# Patient Record
Sex: FEMALE | Race: WHITE | NOT HISPANIC OR LATINO | Employment: OTHER | URBAN - METROPOLITAN AREA
[De-identification: names, ages, dates, MRNs, and addresses within clinical notes are randomized per-mention and may not be internally consistent; named-entity substitution may affect disease eponyms.]

---

## 2019-07-08 ENCOUNTER — OFFICE VISIT (OUTPATIENT)
Dept: URGENT CARE | Facility: URGENT CARE | Age: 79
End: 2019-07-08
Payer: MEDICARE

## 2019-07-08 VITALS
WEIGHT: 178 LBS | SYSTOLIC BLOOD PRESSURE: 135 MMHG | HEART RATE: 81 BPM | DIASTOLIC BLOOD PRESSURE: 74 MMHG | OXYGEN SATURATION: 97 % | TEMPERATURE: 98.5 F

## 2019-07-08 DIAGNOSIS — S01.01XA SCALP LACERATION, INITIAL ENCOUNTER: ICD-10-CM

## 2019-07-08 DIAGNOSIS — S09.90XA HEAD INJURY, INITIAL ENCOUNTER: Primary | ICD-10-CM

## 2019-07-08 PROCEDURE — 99204 OFFICE O/P NEW MOD 45 MIN: CPT | Performed by: PHYSICIAN ASSISTANT

## 2019-07-08 ASSESSMENT — ENCOUNTER SYMPTOMS
NAUSEA: 0
MEMORY LOSS: 1
LOSS OF CONSCIOUSNESS: 0
FEVER: 0
BLURRED VISION: 0
FOCAL WEAKNESS: 0
VOMITING: 0
COUGH: 0
SHORTNESS OF BREATH: 0
DIZZINESS: 0
HEADACHES: 1
SPEECH CHANGE: 1
WEAKNESS: 0
SORE THROAT: 0

## 2019-07-08 NOTE — PROGRESS NOTES
"SUBJECTIVE:   Kirsten Cortés is a 79 year old female presenting for evaluation of   Chief Complaint   Patient presents with     Urgent Care     Pt in clinic c/o scalp laceration, facial brusing and swelling due to fall 7/2/2019.     Facial Swelling     Laceration   .  On 7/2 while in a park in [place], [place], she was walking into the bathroom and fell on a step that was leading towards the shower. She fell with her head into the shower. She struck her head. No LOC. She had a headache that night. She felt nauseous and lightheaded at the time of the incident. No blurry vision.  She sustained a scalp laceration and has a head hematoma.  Says the lac bled a lot. States that a paramedic came to the scene. She says the EMT took her BP and it was \"220 over something.\" The EMT waited with her and she did not want to go into the hospital so she just waited.  She states she has \"had problems with mental acuity\" after the fall. She notes more memory issues than usual, and feeling distracted, and absent-minded.  No other pain other than her head. She has been walking okay.  No blood thinner use.      Review of Systems   Constitutional: Negative for fever and malaise/fatigue.   HENT: Negative for sore throat.         Nose running right side of nose   Eyes: Negative for blurred vision.   Respiratory: Negative for cough and shortness of breath.    Cardiovascular: Negative for chest pain.   Gastrointestinal: Negative for nausea and vomiting.   Skin:        Bruising right side of face   Neurological: Positive for speech change (trouble with word finding) and headaches. Negative for dizziness, focal weakness, loss of consciousness and weakness.   Psychiatric/Behavioral: Positive for memory loss.       PMH:  Past Medical History:   Diagnosis Date     Depression      Hypothyroidism      Renal cell carcinoma (H)      There are no active problems to display for this patient.        Current medications:  No current outpatient " medications on file.       Surgical History:  Past Surgical History:   Procedure Laterality Date     HYSTERECTOMY       KIDNEY SURGERY       THYROIDECTOMY         Family history:  History reviewed. No pertinent family history.      Social History:  Social History     Tobacco Use     Smoking status: Never Smoker     Smokeless tobacco: Never Used   Substance Use Topics     Alcohol use: Not on file     Lives in a senior Co-op    I personally reviewed and updated patient's PMH, PSH, FH, and SH.          OBJECTIVE:  /74   Pulse 81   Temp 98.5  F (36.9  C) (Oral)   Wt 80.7 kg (178 lb)   SpO2 97%     Physical Exam  General Appearance:  Alert, cooperative, older female in no acute distress, appears stated age  Skin: Warm, dry. Fading ecchymosis right side of face, periorbital region.  Head: right posterior parietal region scalp hematoma. 10cm long old scalp laceration running along right parietal to temporal scalp. No active bleeding.   Eyes: Conjunctiva clear, lids normal. PERRL. Right periorbital swelling and eccymosis.   Ear, Nose, Throat: Face nontraumatic, moist mucus membranes. TMs normal bilaterally. No hemotympanum.   Neck: Supple, no lymphadenopathy, no evidence of meningismus. No cervical tenderness. Neck AROM flexion, extension, lateral flexion, lateral rotation intact.  Lungs: No distress. Equal air entry to bases bilaterally. Lungs clear to ausculation bilaterally. No wheezes, rhonchi or stridor. Chest wall nontender.    Heart: Regular rate and rhythm, no murmur, rub or gallop. Strong, equal distal pulses.    Abdomen: Soft, nontender.  No rebound or guarding.    Musculoskeletal: Extremities: Moving all extremities equally. Back: no C, T, or Lspine tenderness or crepitus. Gait: steady, no antalgia. Normal strength.    Neurologic: GCS 15. Alert and orientated appropriately. No focal deficits. Speech and coordination intact.  Psych: Normal mood and affect          Labs:  No results found for this or any  previous visit (from the past 24 hour(s)).    X-Ray was not done.      ASSESSMENT/PLAN:      ICD-10-CM    1. Head injury, initial encounter S09.90XA    2. Scalp laceration, initial encounter S01.01XA         Medical Decision Making:    Serious Comorbid Conditions: RCC    Patient is an elderly female presenting with a head injury on 7/2. Has significant facial ecchymoses and scalp hematoma. Also notes some memory issues worsening since the injury. Given her age, needs head CT, likely also facial bones CT.  I discussed with patient that she needs to be seen in the ED today. She was amenable- will get a ride to the ED at [facility] today. She is appropriate for transport via private car.      Patient Instructions   Please go into the ER at [facility] for a head CT scan today.                  Alexandria Johnson PA-C  07/08/19 1:51 PM

## 2022-04-06 ENCOUNTER — TRANSFERRED RECORDS (OUTPATIENT)
Dept: HEALTH INFORMATION MANAGEMENT | Facility: CLINIC | Age: 82
End: 2022-04-06

## 2023-01-12 ENCOUNTER — TRANSFERRED RECORDS (OUTPATIENT)
Dept: HEALTH INFORMATION MANAGEMENT | Facility: CLINIC | Age: 83
End: 2023-01-12

## 2023-01-25 ENCOUNTER — MEDICAL CORRESPONDENCE (OUTPATIENT)
Dept: HEALTH INFORMATION MANAGEMENT | Facility: CLINIC | Age: 83
End: 2023-01-25

## 2023-01-25 ENCOUNTER — TRANSFERRED RECORDS (OUTPATIENT)
Dept: HEALTH INFORMATION MANAGEMENT | Facility: CLINIC | Age: 83
End: 2023-01-25

## 2023-04-20 ENCOUNTER — TRANSFERRED RECORDS (OUTPATIENT)
Dept: HEALTH INFORMATION MANAGEMENT | Facility: CLINIC | Age: 83
End: 2023-04-20

## 2023-06-06 ENCOUNTER — MEDICAL CORRESPONDENCE (OUTPATIENT)
Dept: HEALTH INFORMATION MANAGEMENT | Facility: CLINIC | Age: 83
End: 2023-06-06

## 2023-07-05 ENCOUNTER — TRANSCRIBE ORDERS (OUTPATIENT)
Dept: OTHER | Age: 83
End: 2023-07-05

## 2023-07-05 ENCOUNTER — MEDICAL CORRESPONDENCE (OUTPATIENT)
Dept: HEALTH INFORMATION MANAGEMENT | Facility: CLINIC | Age: 83
End: 2023-07-05
Payer: MEDICARE

## 2023-07-05 DIAGNOSIS — H53.9 VISUAL DISTURBANCE: Primary | ICD-10-CM

## 2023-07-06 ENCOUNTER — TELEPHONE (OUTPATIENT)
Dept: OPHTHALMOLOGY | Facility: CLINIC | Age: 83
End: 2023-07-06
Payer: MEDICARE

## 2023-07-06 NOTE — TELEPHONE ENCOUNTER
[facility] Call Center    Phone Message    May a detailed message be left on voicemail: yes     Reason for Call: Appointment Intake    Referring Provider Name: Referred by Dr. Claire Hearn at [facility]  Diagnosis and/or Symptoms: referral to Dr. Lira [facility] for visual disturbance. Please review how to schedule.  Not in GL's.    Action Taken: Message routed to:  [facility] ([facility] eye    Travel Screening: Not Applicable

## 2023-07-07 NOTE — TELEPHONE ENCOUNTER
Called and LVM     Kirsten can make an appointment with any general opth ...     Dr. Lira does not see Visual disturbance and is recommending Kirsten see a general opth    Viky Gallo Communication Facilitator on 7/7/2023 at 10:00 AM

## 2023-07-19 ENCOUNTER — MEDICAL CORRESPONDENCE (OUTPATIENT)
Dept: HEALTH INFORMATION MANAGEMENT | Facility: CLINIC | Age: 83
End: 2023-07-19

## 2023-07-19 ENCOUNTER — OFFICE VISIT (OUTPATIENT)
Dept: OPHTHALMOLOGY | Facility: CLINIC | Age: 83
End: 2023-07-19
Attending: FAMILY MEDICINE
Payer: MEDICARE

## 2023-07-19 DIAGNOSIS — Z01.00 EXAMINATION OF EYES AND VISION: ICD-10-CM

## 2023-07-19 DIAGNOSIS — H43.811 PVD (POSTERIOR VITREOUS DETACHMENT), RIGHT EYE: ICD-10-CM

## 2023-07-19 DIAGNOSIS — H52.7 REFRACTIVE ERROR: ICD-10-CM

## 2023-07-19 DIAGNOSIS — H53.10 SUBJECTIVE VISUAL DISTURBANCE OF BOTH EYES: Primary | ICD-10-CM

## 2023-07-19 DIAGNOSIS — H50.05 ALTERNATING ESOTROPIA: ICD-10-CM

## 2023-07-19 DIAGNOSIS — Z96.1 PSEUDOPHAKIA OF BOTH EYES: ICD-10-CM

## 2023-07-19 PROCEDURE — G0463 HOSPITAL OUTPT CLINIC VISIT: HCPCS | Performed by: STUDENT IN AN ORGANIZED HEALTH CARE EDUCATION/TRAINING PROGRAM

## 2023-07-19 PROCEDURE — 92134 CPTRZ OPH DX IMG PST SGM RTA: CPT | Performed by: STUDENT IN AN ORGANIZED HEALTH CARE EDUCATION/TRAINING PROGRAM

## 2023-07-19 PROCEDURE — 92133 CPTRZD OPH DX IMG PST SGM ON: CPT | Performed by: STUDENT IN AN ORGANIZED HEALTH CARE EDUCATION/TRAINING PROGRAM

## 2023-07-19 PROCEDURE — 92004 COMPRE OPH EXAM NEW PT 1/>: CPT | Mod: GC | Performed by: STUDENT IN AN ORGANIZED HEALTH CARE EDUCATION/TRAINING PROGRAM

## 2023-07-19 PROCEDURE — 92134 CPTRZ OPH DX IMG PST SGM RTA: CPT | Mod: 26 | Performed by: STUDENT IN AN ORGANIZED HEALTH CARE EDUCATION/TRAINING PROGRAM

## 2023-07-19 PROCEDURE — 99207 OCT RETINA SPECTRALIS OU (BOTH EYE): CPT | Mod: 26 | Performed by: STUDENT IN AN ORGANIZED HEALTH CARE EDUCATION/TRAINING PROGRAM

## 2023-07-19 RX ORDER — ATORVASTATIN CALCIUM 40 MG/1
40 TABLET, FILM COATED ORAL DAILY
COMMUNITY
Start: 2023-06-16

## 2023-07-19 RX ORDER — FLUOXETINE 40 MG/1
40 CAPSULE ORAL DAILY
COMMUNITY
Start: 2023-05-04

## 2023-07-19 ASSESSMENT — CUP TO DISC RATIO
OS_RATIO: 0.2
OD_RATIO: 0.3

## 2023-07-19 ASSESSMENT — EXTERNAL EXAM - LEFT EYE: OS_EXAM: NORMAL

## 2023-07-19 ASSESSMENT — EXTERNAL EXAM - RIGHT EYE: OD_EXAM: NORMAL

## 2023-07-19 ASSESSMENT — REFRACTION_MANIFEST
OD_SPHERE: -1.50
OS_SPHERE: -0.50
OS_AXIS: 025
OD_CYLINDER: SPHERE
OS_CYLINDER: +0.25
OD_ADD: +2.50
OS_ADD: +2.50

## 2023-07-19 ASSESSMENT — CONF VISUAL FIELD
OS_SUPERIOR_TEMPORAL_RESTRICTION: 0
OD_INFERIOR_NASAL_RESTRICTION: 0
OD_SUPERIOR_NASAL_RESTRICTION: 0
OD_INFERIOR_TEMPORAL_RESTRICTION: 0
OS_NORMAL: 1
OS_INFERIOR_TEMPORAL_RESTRICTION: 0
OS_INFERIOR_NASAL_RESTRICTION: 0
OD_NORMAL: 1
OS_SUPERIOR_NASAL_RESTRICTION: 0
OD_SUPERIOR_TEMPORAL_RESTRICTION: 0
METHOD: COUNTING FINGERS

## 2023-07-19 ASSESSMENT — VISUAL ACUITY
OD_SC: 20/40
OS_SC: 20/20
OD_SC+: +1
METHOD: SNELLEN - LINEAR
OD_PH_SC: 20/25
OD_SC: J1

## 2023-07-19 ASSESSMENT — TONOMETRY
OD_IOP_MMHG: 12
IOP_METHOD: TONOPEN
OS_IOP_MMHG: 10

## 2023-07-19 NOTE — LETTER
"7/19/2023       RE: Kirsten Cortés  [address]  [facility] [address]     Dear Colleague,    Thank you for referring your patient, Kirsten Cortés, to the [facility] [place] at [facility]. Please see a copy of my visit note below.    HPI     Referral     Additional comments: Referred by Dr. Claire Hearn at [facility] for visual disturbance.           COMPREHENSIVE EYE EXAM    In both eyes.  Associated symptoms include dryness and floaters.  Negative for glare, haloes, double vision, eye pain, redness, tearing, headache, photophobia and flashes.  Treatments tried include artificial tears.  Pain was noted as 0/10. Additional comments: New patient comprehensive eye exam.           Comments    Eye meds: AT's each eye as needed  Normal findings at last 2 eye exams Dr. Kc and Dr. Spencer.  Sees spots of color sometimes.  Floaters in left eye, goes away.  No history of migraine.  In dark see well defined shapes that wave as if a current passing through them.  Sparkles flickering in vision, sequins.  Does not wear glasses since cataract surgery each eye in 2022.    REESE Latham 7/19/2023 9:31 AM            Last edited by Ankit Daly CO on 7/19/2023  9:31 AM.          Patient had normal baseline vision other than changes from cataracts in both eyes back in 4/2022 when she was seen at [facility] and evaluated for cataract surgery with Dr. Le with plan for monofocal IOL with target of monovision (-2.00 OD and distance OS). Both surgeries performed 6/2022 and 7/2022 with normal follow up. Vision was fine per patient until 10/2022 when she started noticing changes in the vision of both her eyes. She brings a printed a paper which describes her symptoms as follows:     \"For eight months now I've seen black sparkling wrigglies in my dark bedroom just after turning off the light. Dark, slender waving shapes hang from the ceiling " "above my face. If I move my hand among them, some will still wriggle in front of my hand. Occasionally I will feel the lightest touch, like a barely noticeable electric shock. When I close my eyes and open them again, the shapes have disappeared. Recently I've begun to see subtle movements in a textured surface like the leather grain of my purse chau the texture of a painted wall. Once the paper drape on the exam chair/table in my clinic moved in large slow movements as if it were breathing. Otherwise my sight is normal. Two ophthalmologists have told me my retina is healthy.\"    First evaluated by Dr. Spencer in a private practice for these symptoms who felt patient had normal exam. Then seen at [facility] for this 4/2023 by optom who found normal exam. PCP ordered MRI brain which was normal 5/2023.     Today she states the symptoms are every day and only at night mostly only for the short period after she turns off the light at night. In clinic she feels the center ceiling tile was moving a little when she was looking at it. Denies flashes. Did have brief floater in the past in left eye but has since resolved. Symptoms last as long as she keeps her eyes open and then resolves when she closes her eyes. They return when she keeps her eyes open for a while at night. No migraines or headaches.     Review of systems for the eyes was negative other than the pertinent positives/negatives listed in the HPI.    Ocular Meds:   Artificial tears PRN    Ocular Hx:   6/24/2022  KPE PCL OD set to near -2.00 (Dr. Emily Gracia)  CCA0T0  7/8/2022 KPE/PCL OS set to emmetropia.(Dr. Emily Gracia)  CCA0T0  BREE OU  MGD OU  ABMD OU      FOHx: no family history of glaucoma or blindness    PMHx:   Past Medical History:   Diagnosis Date     Depression      Hypothyroidism      Renal cell carcinoma (H)      Imaging:  MR BRAIN without and with Contrast 5/19/23  IMPRESSION:   1.  No finding for intracranial hemorrhage, mass, or acute infarct. "     2.  Mild volume loss and mild to moderate presumed sequela chronic microvascular ischemic change.       Assessment & Plan     Kirsten Cortés is a 83 year old female with the following diagnoses:    Subjective visual disturbance OU  - Onset ~10/2020. CEIOL OU 6-7/2022. Describes as variable subtle intermittent changes/distortions in vision mostly in the dark when going to sleep that resolve when she closes her eyes. No migraines associated. No daytime visual snow.   - visual acuity excellent, IOP wnl, no RAPD OU, color plates grossly full OU, CVF full to CF OU  - anterior/posterior eye exam grossly unremarkable  - OCT RNFL and OCT Macula also grossly unremarkable OU  - MRI Brain as above  - overall eye exam reassuring  - Primary care provider recommended evaluation by Dr. Lira. Patient states given that today her eye health looks okay she will hold off on following up with neuro-ophthalmology; if any changes occur or worsening of symptoms patient can call to schedule follow up  - of note, patient having memory issues, and does have family history of dementia; advised to keep follow up with neurology  - letter to Dr. Claire Hearn    Pseudophakia OU  - observe    MGD/Dry eyes OU  - warm compresses BID OU x 5-10 min each time  - PFATs qid and prn OU    Epiretinal membrane OU  - very minimal, noted on OCT macula OU  - Not visually significant  - amsler precautions  - observe    Intermittent Esotropia   - EOM otherwise full OU; patient does not have diplopia  - offered referral to neuro-ophthalmology, but will hold off for now given asymptomatic; any changes patient will call in     Posterior Vitreous Detachment OD  - no new flashes, floaters, or curtain down visual field  - no holes, tears, or detachment on exam; ignacia's negative OU  - counseled RD/return precautions    Drusen OD  - rare and small; noted on oct macula  - green leafy vegetables  - amsler precautions    Refractive error   - patient has mono vision;  right eye is near left eye is distance vision  - observe    Counseled return/RD precautions    Patient disposition:   Return in about 1 year (around 7/19/2024) for Annual Visit, or sooner changes. patient plans to follow up with her regular eye care provider    Jyoti Richards MD  PGY-4  Ophthalmology   [facility]    Attending Physician Attestation:  Complete documentation of historical and exam elements from today's encounter can be found in the full encounter summary report (not reduplicated in this progress note).  I personally obtained the chief complaint(s) and history of present illness.  I confirmed and edited as necessary the review of systems, past medical/surgical history, family history, social history, and examination findings as documented by others; and I examined the patient myself.  I personally reviewed the relevant tests, images, and reports as documented above.  I formulated and edited as necessary the assessment and plan and discussed the findings and management plan with the patient and family. . - Haley Miranda MD        Again, thank you for allowing me to participate in the care of your patient.      Sincerely,    Haley Miranda MD

## 2023-07-19 NOTE — PROGRESS NOTES
"HPI     Referral     Additional comments: Referred by Dr. Claire Hearn at [facility] for visual disturbance.           COMPREHENSIVE EYE EXAM    In both eyes.  Associated symptoms include dryness and floaters.  Negative for glare, haloes, double vision, eye pain, redness, tearing, headache, photophobia and flashes.  Treatments tried include artificial tears.  Pain was noted as 0/10. Additional comments: New patient comprehensive eye exam.           Comments    Eye meds: AT's each eye as needed  Normal findings at last 2 eye exams Dr. Kc and Dr. Spencer.  Sees spots of color sometimes.  Floaters in left eye, goes away.  No history of migraine.  In dark see well defined shapes that wave as if a current passing through them.  Sparkles flickering in vision, sequins.  Does not wear glasses since cataract surgery each eye in 2022.    REESE Latham 7/19/2023 9:31 AM            Last edited by Ankit Daly CO on 7/19/2023  9:31 AM.          Patient had normal baseline vision other than changes from cataracts in both eyes back in 4/2022 when she was seen at [facility] and evaluated for cataract surgery with Dr. Le with plan for monofocal IOL with target of monovision (-2.00 OD and distance OS). Both surgeries performed 6/2022 and 7/2022 with normal follow up. Vision was fine per patient until 10/2022 when she started noticing changes in the vision of both her eyes. She brings a printed a paper which describes her symptoms as follows:     \"For eight months now I've seen black sparkling wrigglies in my dark bedroom just after turning off the light. Dark, slender waving shapes hang from the ceiling above my face. If I move my hand among them, some will still wriggle in front of my hand. Occasionally I will feel the lightest touch, like a barely noticeable electric shock. When I close my eyes and open them again, the shapes have disappeared. Recently I've begun to see subtle movements in a textured surface like the " "leather grain of my purse chau the texture of a painted wall. Once the paper drape on the exam chair/table in my clinic moved in large slow movements as if it were breathing. Otherwise my sight is normal. Two ophthalmologists have told me my retina is healthy.\"    First evaluated by Dr. Spencer in a private practice for these symptoms who felt patient had normal exam. Then seen at [facility] for this 4/2023 by optom who found normal exam. PCP ordered MRI brain which was normal 5/2023.     Today she states the symptoms are every day and only at night mostly only for the short period after she turns off the light at night. In clinic she feels the center ceiling tile was moving a little when she was looking at it. Denies flashes. Did have brief floater in the past in left eye but has since resolved. Symptoms last as long as she keeps her eyes open and then resolves when she closes her eyes. They return when she keeps her eyes open for a while at night. No migraines or headaches.     Review of systems for the eyes was negative other than the pertinent positives/negatives listed in the HPI.    Ocular Meds:   Artificial tears PRN    Ocular Hx:   6/24/2022  KPE PCL OD set to near -2.00 (Dr. Emily Gracia)  CCA0T0  7/8/2022 KPE/PCL OS set to emmetropia.(Dr. Emily Gracia)  CCA0T0  BREE OU  MGD OU  ABMD OU      FOHx: no family history of glaucoma or blindness    PMHx:   Past Medical History:   Diagnosis Date     Depression      Hypothyroidism      Renal cell carcinoma (H)      Imaging:  MR BRAIN without and with Contrast 5/19/23  IMPRESSION:   1.  No finding for intracranial hemorrhage, mass, or acute infarct.     2.  Mild volume loss and mild to moderate presumed sequela chronic microvascular ischemic change.       Assessment & Plan      Kirsten Cortés is a 83 year old female with the following diagnoses:    Subjective visual disturbance OU  - Onset ~10/2020. CEIOL OU 6-7/2022. Describes as variable subtle intermittent " changes/distortions in vision mostly in the dark when going to sleep that resolve when she closes her eyes. No migraines associated. No daytime visual snow.   - visual acuity excellent, IOP wnl, no RAPD OU, color plates grossly full OU, CVF full to CF OU  - anterior/posterior eye exam grossly unremarkable  - OCT RNFL and OCT Macula also grossly unremarkable OU  - MRI Brain as above  - overall eye exam reassuring  - Primary care provider recommended evaluation by Dr. Lira. Patient states given that today her eye health looks okay she will hold off on following up with neuro-ophthalmology; if any changes occur or worsening of symptoms patient can call to schedule follow up  - of note, patient having memory issues, and does have family history of dementia; advised to keep follow up with neurology  - letter to Dr. Claire Hearn    Pseudophakia OU  - observe    MGD/Dry eyes OU  - warm compresses BID OU x 5-10 min each time  - PFATs qid and prn OU    Epiretinal membrane OU  - very minimal, noted on OCT macula OU  - Not visually significant  - amsler precautions  - observe    Intermittent Esotropia   - EOM otherwise full OU; patient does not have diplopia  - offered referral to neuro-ophthalmology, but will hold off for now given asymptomatic; any changes patient will call in     Posterior Vitreous Detachment OD  - no new flashes, floaters, or curtain down visual field  - no holes, tears, or detachment on exam; ignacia's negative OU  - counseled RD/return precautions    Drusen OD  - rare and small; noted on oct macula  - green leafy vegetables  - amsler precautions    Refractive error   - patient has mono vision; right eye is near left eye is distance vision  - observe    Counseled return/RD precautions    Patient disposition:   Return in about 1 year (around 7/19/2024) for Annual Visit, or sooner changes. patient plans to follow up with her regular eye care provider    Jyoti Richards MD  PGY-4  Ophthalmology   [facility]  [place]    Attending Physician Attestation:  Complete documentation of historical and exam elements from today's encounter can be found in the full encounter summary report (not reduplicated in this progress note).  I personally obtained the chief complaint(s) and history of present illness.  I confirmed and edited as necessary the review of systems, past medical/surgical history, family history, social history, and examination findings as documented by others; and I examined the patient myself.  I personally reviewed the relevant tests, images, and reports as documented above.  I formulated and edited as necessary the assessment and plan and discussed the findings and management plan with the patient and family. . - Haley Miranda MD

## 2023-07-19 NOTE — NURSING NOTE
Chief Complaints and History of Present Illnesses   Patient presents with     Referral     Referred by Dr. Claire Hearn at [facility] for visual disturbance.     COMPREHENSIVE EYE EXAM     New patient comprehensive eye exam.      Chief Complaint(s) and History of Present Illness(es)     Referral            Comments: Referred by Dr. Claire Hearn at [facility] for visual disturbance.          COMPREHENSIVE EYE EXAM            Laterality: both eyes    Associated symptoms: dryness and floaters.  Negative for glare, haloes, double vision, eye pain, redness, tearing, headache, photophobia and flashes    Treatments tried: artificial tears    Pain scale: 0/10    Comments: New patient comprehensive eye exam.          Comments    Eye meds: AT's each eye as needed  Normal findings at last 2 eye exams Dr. Kc and Dr. Spencer.  Sees spots of color sometimes.  Floaters in left eye, goes away.  No history of migraine.  In dark see well defined shapes that wave as if a current passing through them.  Sparkles flickering in vision, sequins.  Does not wear glasses since cataract surgery each eye in 2022.    REESE Latham 7/19/2023 9:31 AM

## 2023-07-20 ASSESSMENT — SLIT LAMP EXAM - LIDS
COMMENTS: MGD
COMMENTS: MGD

## 2023-08-19 ENCOUNTER — HEALTH MAINTENANCE LETTER (OUTPATIENT)
Age: 83
End: 2023-08-19

## 2023-10-15 ENCOUNTER — OFFICE VISIT (OUTPATIENT)
Dept: URGENT CARE | Facility: URGENT CARE | Age: 83
End: 2023-10-15
Payer: MEDICARE

## 2023-10-15 VITALS
WEIGHT: 174 LBS | HEART RATE: 85 BPM | DIASTOLIC BLOOD PRESSURE: 84 MMHG | TEMPERATURE: 98.2 F | OXYGEN SATURATION: 95 % | SYSTOLIC BLOOD PRESSURE: 136 MMHG | HEIGHT: 64 IN | BODY MASS INDEX: 29.71 KG/M2

## 2023-10-15 DIAGNOSIS — S09.92XA NASAL TRAUMA, INITIAL ENCOUNTER: ICD-10-CM

## 2023-10-15 DIAGNOSIS — S01.511A LIP LACERATION, INITIAL ENCOUNTER: Primary | ICD-10-CM

## 2023-10-15 DIAGNOSIS — S61.412A LACERATION OF LEFT HAND WITHOUT FOREIGN BODY, INITIAL ENCOUNTER: ICD-10-CM

## 2023-10-15 PROCEDURE — 12011 RPR F/E/E/N/L/M 2.5 CM/<: CPT | Performed by: INTERNAL MEDICINE

## 2023-10-15 PROCEDURE — 12001 RPR S/N/AX/GEN/TRNK 2.5CM/<: CPT | Mod: XS | Performed by: INTERNAL MEDICINE

## 2023-10-15 ASSESSMENT — ENCOUNTER SYMPTOMS
NAUSEA: 0
VOMITING: 0
FATIGUE: 0

## 2023-10-16 ENCOUNTER — TELEPHONE (OUTPATIENT)
Dept: NURSING | Facility: CLINIC | Age: 83
End: 2023-10-16
Payer: MEDICARE
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
## 2023-10-16 NOTE — PATIENT INSTRUCTIONS
Please watch for symptoms of concussion due to fall and head injury.    Lip laceration repaired with glue    Hand laceration with stitches.  Return for stitch removal in 7 to 10 days.    Call or return to clinic if symptoms worsen or fail to improve as anticipated.

## 2023-10-17 ENCOUNTER — OFFICE VISIT (OUTPATIENT)
Dept: URGENT CARE | Facility: URGENT CARE | Age: 83
End: 2023-10-17
Payer: MEDICARE

## 2023-10-17 DIAGNOSIS — Z53.9 DIAGNOSIS NOT YET DEFINED: Primary | ICD-10-CM

## 2023-10-23 ENCOUNTER — OFFICE VISIT (OUTPATIENT)
Dept: URGENT CARE | Facility: URGENT CARE | Age: 83
End: 2023-10-23
Payer: MEDICARE

## 2023-10-23 DIAGNOSIS — S61.409A OPEN WOUND, HAND: Primary | ICD-10-CM

## 2023-10-23 PROCEDURE — 99207 PR NO CHARGE NURSE ONLY: CPT

## 2023-10-23 NOTE — PROGRESS NOTES
Okay per Collin Haas PA-C to remove sutures. 2 sutures removed, wound well healed.  Katharina De La Rosa, CMA

## 2023-11-03 ENCOUNTER — TRANSFERRED RECORDS (OUTPATIENT)
Dept: MULTI SPECIALTY CLINIC | Facility: CLINIC | Age: 83
End: 2023-11-03

## 2023-11-03 LAB
CREATININE (EXTERNAL): 0.88 MG/DL (ref 0.6–0.95)
GFR ESTIMATED (EXTERNAL): 66 ML/MIN/1.73M2
GLUCOSE (EXTERNAL): 77 MG/DL (ref 65–139)
POTASSIUM (EXTERNAL): 4.1 MMOL/L (ref 3.4–4.8)

## 2023-11-16 ENCOUNTER — ANESTHESIA EVENT (OUTPATIENT)
Dept: SURGERY | Facility: CLINIC | Age: 83
DRG: 470 | End: 2023-11-16
Payer: MEDICARE

## 2023-11-16 RX ORDER — ACETAMINOPHEN 500 MG
500-1000 TABLET ORAL 2 TIMES DAILY
Status: ON HOLD | COMMUNITY
End: 2023-11-20

## 2023-11-16 RX ORDER — CYANOCOBALAMIN 1000 UG/ML
1 INJECTION, SOLUTION INTRAMUSCULAR; SUBCUTANEOUS
COMMUNITY

## 2023-11-16 NOTE — PROGRESS NOTES
PTA medications updated by Medication Scribe prior to surgery via phone call with patient (last doses completed by Nurse)     Medication history sources: Patient, Surescripts, H&P, and Patient's home med list  In the past week, patient estimated taking medication this percent of the time: Greater than 90%      Significant changes made to the medication list:  Patient reports no longer taking the following meds (med scribe removed from PTA med list): Simvastatin, Prozac 10 mg      Additional medication history information:   None    Medication reconciliation completed by provider prior to medication history? No    Time spent in this activity: 35 minutes    The information provided in this note is only as accurate as the sources available at the time of update(s)      Prior to Admission medications    Medication Sig Last Dose Taking? Auth Provider Long Term End Date   acetaminophen (TYLENOL) 500 MG tablet Take 500-1,000 mg by mouth 2 times daily 11/16/2023 at PM Yes Reported, Patient     atorvastatin (LIPITOR) 40 MG tablet Take 40 mg by mouth daily for cholesterol  at AM Yes Reported, Patient Yes    CHOLECALCIFEROL, VITAMIN D3, (VITAMIN D3 ORAL) [CHOLECALCIFEROL, VITAMIN D3, (VITAMIN D3 ORAL)] Take 2,000 Units by mouth daily. 11/15/2023 at AM Yes Provider, Historical     cyanocobalamin (CYANOCOBALAMIN) 1000 MCG/ML injection Inject 1 mL into the muscle every 30 days 10/19/2023 at AM Yes Reported, Patient     FLUoxetine (PROZAC) 40 MG capsule Take 40 mg by mouth daily  at AM Yes Reported, Patient     levothyroxine (SYNTHROID/LEVOTHROID) 100 MCG tablet Take 100 mcg by mouth daily  at AM Yes Provider, Historical     PSYLLIUM SEED, WITH SUGAR, (METAMUCIL ORAL) Take 1 Tablespoonful by mouth daily as needed Unknown at PRN Yes Provider, Historical

## 2023-11-16 NOTE — ANESTHESIA PREPROCEDURE EVALUATION
Anesthesia Pre-Procedure Evaluation    Patient: Kirsten Cortés   MRN: [id number] : 1940        Procedure : Procedure(s):  RIGHT TOTAL KNEE ARTHROPLASTY          Past Medical History:   Diagnosis Date    Anxiety     Arthritis     Back pain     Cognitive impairment     Depression     Falls frequently     History of kidney stones     Hypothyroidism     Renal cell carcinoma (H)       Past Surgical History:   Procedure Laterality Date    HYSTERECTOMY      HYSTERECTOMY      KIDNEY SURGERY Left 2015    nephrectomty    THYROIDECTOMY      THYROIDECTOMY        Allergies   Allergen Reactions    Oxycodone-Acetaminophen Other (See Comments)     And disoriented, hallucinations        Social History     Tobacco Use    Smoking status: Former     Types: Cigarettes    Smokeless tobacco: Never   Substance Use Topics    Alcohol use: Yes     Comment: moderate- per H & P      Wt Readings from Last 1 Encounters:   10/15/23 78.9 kg (174 lb)        Anesthesia Evaluation   Pt has had prior anesthetic.     No history of anesthetic complications       ROS/MED HX  ENT/Pulmonary:  - neg pulmonary ROS     Neurologic: Comment: H/o cognitive impairment    H/o back pain      Cardiovascular: Comment: H/o LBBB      METS/Exercise Tolerance:     Hematologic: Comments: Hgb 13.5 / Plt 245      Musculoskeletal:   (+)  arthritis,             GI/Hepatic:  - neg GI/hepatic ROS     Renal/Genitourinary: Comment: H/o renal cell carcinoma    H/o nephrectomy    Cr 0.96    (+)       Nephrolithiasis ,       Endo:     (+)          thyroid problem, hypothyroidism,           Psychiatric/Substance Use:     (+) psychiatric history anxiety and depression       Infectious Disease:  - neg infectious disease ROS     Malignancy:  - neg malignancy ROS     Other:            Physical Exam    Airway  airway exam normal      Mallampati: II   TM distance: > 3 FB   Neck ROM: full   Mouth opening: > 3 cm    Respiratory Devices and Support         Dental       (+) Minor  "Abnormalities - some fillings, tiny chips      Cardiovascular   cardiovascular exam normal          Pulmonary   pulmonary exam normal                OUTSIDE LABS:  CBC: No results found for: \"WBC\", \"HGB\", \"HCT\", \"PLT\"  BMP: No results found for: \"NA\", \"POTASSIUM\", \"CHLORIDE\", \"CO2\", \"BUN\", \"CR\", \"GLC\"  COAGS: No results found for: \"PTT\", \"INR\", \"FIBR\"  POC: No results found for: \"BGM\", \"HCG\", \"HCGS\"  HEPATIC: No results found for: \"ALBUMIN\", \"PROTTOTAL\", \"ALT\", \"AST\", \"GGT\", \"ALKPHOS\", \"BILITOTAL\", \"BILIDIRECT\", \"[place]\"  OTHER: No results found for: \"PH\", \"LACT\", \"A1C\", \"SHON\", \"PHOS\", \"MAG\", \"LIPASE\", \"AMYLASE\", \"TSH\", \"T4\", \"T3\", \"CRP\", \"SED\"    Anesthesia Plan    ASA Status:  2    NPO Status:  NPO Appropriate    Anesthesia Type: Spinal.              Consents    Anesthesia Plan(s) and associated risks, benefits, and realistic alternatives discussed. Questions answered and patient/representative(s) expressed understanding.     - Discussed:     - Discussed with:  Patient            Postoperative Care    Pain management: IV analgesics, Peripheral nerve block (Continuous).   PONV prophylaxis: Ondansetron (or other 5HT-3), Dexamethasone or Solumedrol     Comments:                Jasvir Hardy MD  "

## 2023-11-17 ENCOUNTER — APPOINTMENT (OUTPATIENT)
Dept: PHYSICAL THERAPY | Facility: CLINIC | Age: 83
DRG: 470 | End: 2023-11-17
Attending: ORTHOPAEDIC SURGERY
Payer: MEDICARE

## 2023-11-17 ENCOUNTER — ANESTHESIA (OUTPATIENT)
Dept: SURGERY | Facility: CLINIC | Age: 83
DRG: 470 | End: 2023-11-17
Payer: MEDICARE

## 2023-11-17 ENCOUNTER — APPOINTMENT (OUTPATIENT)
Dept: GENERAL RADIOLOGY | Facility: CLINIC | Age: 83
DRG: 470 | End: 2023-11-17
Attending: SPECIALIST/TECHNOLOGIST
Payer: MEDICARE

## 2023-11-17 ENCOUNTER — HOSPITAL ENCOUNTER (INPATIENT)
Facility: CLINIC | Age: 83
LOS: 3 days | Discharge: NURSING, REHAB OR LONG-TERM CARE | DRG: 470 | End: 2023-11-21
Attending: ORTHOPAEDIC SURGERY | Admitting: ORTHOPAEDIC SURGERY
Payer: MEDICARE

## 2023-11-17 DIAGNOSIS — Z96.651 S/P TOTAL KNEE ARTHROPLASTY, RIGHT: Primary | ICD-10-CM

## 2023-11-17 PROBLEM — M17.11 OSTEOARTHRITIS OF RIGHT KNEE, UNSPECIFIED OSTEOARTHRITIS TYPE: Status: ACTIVE | Noted: 2023-11-17

## 2023-11-17 PROCEDURE — 258N000003 HC RX IP 258 OP 636: Performed by: SPECIALIST/TECHNOLOGIST

## 2023-11-17 PROCEDURE — 97530 THERAPEUTIC ACTIVITIES: CPT | Mod: GP

## 2023-11-17 PROCEDURE — 250N000011 HC RX IP 250 OP 636: Mod: JZ | Performed by: ANESTHESIOLOGY

## 2023-11-17 PROCEDURE — 250N000011 HC RX IP 250 OP 636: Performed by: ORTHOPAEDIC SURGERY

## 2023-11-17 PROCEDURE — 250N000009 HC RX 250: Performed by: ANESTHESIOLOGY

## 2023-11-17 PROCEDURE — 250N000011 HC RX IP 250 OP 636: Performed by: ANESTHESIOLOGY

## 2023-11-17 PROCEDURE — 710N000009 HC RECOVERY PHASE 1, LEVEL 1, PER MIN: Performed by: ORTHOPAEDIC SURGERY

## 2023-11-17 PROCEDURE — 272N000001 HC OR GENERAL SUPPLY STERILE: Performed by: ORTHOPAEDIC SURGERY

## 2023-11-17 PROCEDURE — 258N000003 HC RX IP 258 OP 636: Performed by: ANESTHESIOLOGY

## 2023-11-17 PROCEDURE — 0SRC0J9 REPLACEMENT OF RIGHT KNEE JOINT WITH SYNTHETIC SUBSTITUTE, CEMENTED, OPEN APPROACH: ICD-10-PCS | Performed by: ORTHOPAEDIC SURGERY

## 2023-11-17 PROCEDURE — 370N000017 HC ANESTHESIA TECHNICAL FEE, PER MIN: Performed by: ORTHOPAEDIC SURGERY

## 2023-11-17 PROCEDURE — 999N000065 XR KNEE PORT RIGHT 1/2 VIEWS: Mod: RT

## 2023-11-17 PROCEDURE — 250N000009 HC RX 250: Performed by: ORTHOPAEDIC SURGERY

## 2023-11-17 PROCEDURE — 250N000011 HC RX IP 250 OP 636: Mod: JZ | Performed by: SPECIALIST/TECHNOLOGIST

## 2023-11-17 PROCEDURE — C1713 ANCHOR/SCREW BN/BN,TIS/BN: HCPCS | Performed by: ORTHOPAEDIC SURGERY

## 2023-11-17 PROCEDURE — 258N000001 HC RX 258: Performed by: ORTHOPAEDIC SURGERY

## 2023-11-17 PROCEDURE — 360N000077 HC SURGERY LEVEL 4, PER MIN: Performed by: ORTHOPAEDIC SURGERY

## 2023-11-17 PROCEDURE — 258N000003 HC RX IP 258 OP 636: Performed by: NURSE ANESTHETIST, CERTIFIED REGISTERED

## 2023-11-17 PROCEDURE — 250N000011 HC RX IP 250 OP 636: Mod: JZ | Performed by: NURSE ANESTHETIST, CERTIFIED REGISTERED

## 2023-11-17 PROCEDURE — C1776 JOINT DEVICE (IMPLANTABLE): HCPCS | Performed by: ORTHOPAEDIC SURGERY

## 2023-11-17 PROCEDURE — 99207 PR NO BILLABLE SERVICE THIS VISIT: CPT | Performed by: INTERNAL MEDICINE

## 2023-11-17 PROCEDURE — 250N000011 HC RX IP 250 OP 636: Performed by: SPECIALIST/TECHNOLOGIST

## 2023-11-17 PROCEDURE — 999N000141 HC STATISTIC PRE-PROCEDURE NURSING ASSESSMENT: Performed by: ORTHOPAEDIC SURGERY

## 2023-11-17 PROCEDURE — 97116 GAIT TRAINING THERAPY: CPT | Mod: GP

## 2023-11-17 PROCEDURE — 97161 PT EVAL LOW COMPLEX 20 MIN: CPT | Mod: GP

## 2023-11-17 PROCEDURE — 250N000013 HC RX MED GY IP 250 OP 250 PS 637: Performed by: SPECIALIST/TECHNOLOGIST

## 2023-11-17 DEVICE — PATELLA
Type: IMPLANTABLE DEVICE | Site: KNEE | Status: FUNCTIONAL
Brand: TRIATHLON

## 2023-11-17 DEVICE — TIBIAL BEARING INSERT - CS
Type: IMPLANTABLE DEVICE | Site: KNEE | Status: FUNCTIONAL
Brand: TRIATHLON

## 2023-11-17 DEVICE — UNIVERSAL TIBIAL BASEPLATE
Type: IMPLANTABLE DEVICE | Site: KNEE | Status: FUNCTIONAL
Brand: TRIATHLON

## 2023-11-17 DEVICE — BONE CEMENT SIMPLEX FULL DOSE 6191-1-001: Type: IMPLANTABLE DEVICE | Site: KNEE | Status: FUNCTIONAL

## 2023-11-17 DEVICE — CRUCIATE RETAINING FEMORAL
Type: IMPLANTABLE DEVICE | Site: KNEE | Status: FUNCTIONAL
Brand: TRIATHLON

## 2023-11-17 RX ORDER — FENTANYL CITRATE 0.05 MG/ML
50 INJECTION, SOLUTION INTRAMUSCULAR; INTRAVENOUS
Status: DISCONTINUED | OUTPATIENT
Start: 2023-11-17 | End: 2023-11-17 | Stop reason: HOSPADM

## 2023-11-17 RX ORDER — PROCHLORPERAZINE MALEATE 5 MG
5 TABLET ORAL EVERY 6 HOURS PRN
Status: DISCONTINUED | OUTPATIENT
Start: 2023-11-17 | End: 2023-11-21 | Stop reason: HOSPADM

## 2023-11-17 RX ORDER — SODIUM CHLORIDE, SODIUM LACTATE, POTASSIUM CHLORIDE, CALCIUM CHLORIDE 600; 310; 30; 20 MG/100ML; MG/100ML; MG/100ML; MG/100ML
INJECTION, SOLUTION INTRAVENOUS CONTINUOUS
Status: DISCONTINUED | OUTPATIENT
Start: 2023-11-17 | End: 2023-11-20

## 2023-11-17 RX ORDER — LIDOCAINE 40 MG/G
CREAM TOPICAL
Status: DISCONTINUED | OUTPATIENT
Start: 2023-11-17 | End: 2023-11-21 | Stop reason: HOSPADM

## 2023-11-17 RX ORDER — CEFAZOLIN SODIUM/WATER 2 G/20 ML
2 SYRINGE (ML) INTRAVENOUS SEE ADMIN INSTRUCTIONS
Status: DISCONTINUED | OUTPATIENT
Start: 2023-11-17 | End: 2023-11-17 | Stop reason: HOSPADM

## 2023-11-17 RX ORDER — ONDANSETRON 4 MG/1
4 TABLET, ORALLY DISINTEGRATING ORAL EVERY 6 HOURS PRN
Status: DISCONTINUED | OUTPATIENT
Start: 2023-11-17 | End: 2023-11-21 | Stop reason: HOSPADM

## 2023-11-17 RX ORDER — VANCOMYCIN HYDROCHLORIDE 1 G/20ML
INJECTION, POWDER, LYOPHILIZED, FOR SOLUTION INTRAVENOUS PRN
Status: DISCONTINUED | OUTPATIENT
Start: 2023-11-17 | End: 2023-11-17 | Stop reason: HOSPADM

## 2023-11-17 RX ORDER — ONDANSETRON 2 MG/ML
4 INJECTION INTRAMUSCULAR; INTRAVENOUS EVERY 6 HOURS PRN
Status: DISCONTINUED | OUTPATIENT
Start: 2023-11-17 | End: 2023-11-21 | Stop reason: HOSPADM

## 2023-11-17 RX ORDER — HYDROCODONE BITARTRATE AND ACETAMINOPHEN 5; 325 MG/1; MG/1
1-2 TABLET ORAL EVERY 6 HOURS PRN
Status: DISCONTINUED | OUTPATIENT
Start: 2023-11-17 | End: 2023-11-19

## 2023-11-17 RX ORDER — POLYETHYLENE GLYCOL 3350 17 G/17G
1 POWDER, FOR SOLUTION ORAL DAILY
Qty: 7 PACKET | Refills: 0 | Status: SHIPPED | OUTPATIENT
Start: 2023-11-17

## 2023-11-17 RX ORDER — ATORVASTATIN CALCIUM 20 MG/1
40 TABLET, FILM COATED ORAL DAILY
Status: DISCONTINUED | OUTPATIENT
Start: 2023-11-18 | End: 2023-11-21 | Stop reason: HOSPADM

## 2023-11-17 RX ORDER — ASPIRIN 81 MG/1
81 TABLET ORAL 2 TIMES DAILY
Qty: 90 TABLET | Refills: 0 | Status: SHIPPED | OUTPATIENT
Start: 2023-11-17

## 2023-11-17 RX ORDER — NALOXONE HYDROCHLORIDE 0.4 MG/ML
0.4 INJECTION, SOLUTION INTRAMUSCULAR; INTRAVENOUS; SUBCUTANEOUS
Status: DISCONTINUED | OUTPATIENT
Start: 2023-11-17 | End: 2023-11-21 | Stop reason: HOSPADM

## 2023-11-17 RX ORDER — SODIUM CHLORIDE, SODIUM LACTATE, POTASSIUM CHLORIDE, CALCIUM CHLORIDE 600; 310; 30; 20 MG/100ML; MG/100ML; MG/100ML; MG/100ML
INJECTION, SOLUTION INTRAVENOUS CONTINUOUS
Status: DISCONTINUED | OUTPATIENT
Start: 2023-11-17 | End: 2023-11-17 | Stop reason: HOSPADM

## 2023-11-17 RX ORDER — DEXAMETHASONE SODIUM PHOSPHATE 4 MG/ML
INJECTION, SOLUTION INTRA-ARTICULAR; INTRALESIONAL; INTRAMUSCULAR; INTRAVENOUS; SOFT TISSUE PRN
Status: DISCONTINUED | OUTPATIENT
Start: 2023-11-17 | End: 2023-11-17

## 2023-11-17 RX ORDER — NALOXONE HYDROCHLORIDE 0.4 MG/ML
0.2 INJECTION, SOLUTION INTRAMUSCULAR; INTRAVENOUS; SUBCUTANEOUS
Status: DISCONTINUED | OUTPATIENT
Start: 2023-11-17 | End: 2023-11-21 | Stop reason: HOSPADM

## 2023-11-17 RX ORDER — AMOXICILLIN 250 MG
1 CAPSULE ORAL 2 TIMES DAILY
Status: DISCONTINUED | OUTPATIENT
Start: 2023-11-17 | End: 2023-11-21 | Stop reason: HOSPADM

## 2023-11-17 RX ORDER — POLYETHYLENE GLYCOL 3350 17 G/17G
17 POWDER, FOR SOLUTION ORAL DAILY
Status: DISCONTINUED | OUTPATIENT
Start: 2023-11-18 | End: 2023-11-21 | Stop reason: HOSPADM

## 2023-11-17 RX ORDER — CEFAZOLIN SODIUM/WATER 2 G/20 ML
2 SYRINGE (ML) INTRAVENOUS
Status: COMPLETED | OUTPATIENT
Start: 2023-11-17 | End: 2023-11-17

## 2023-11-17 RX ORDER — TRANEXAMIC ACID 650 MG/1
1950 TABLET ORAL ONCE
Status: COMPLETED | OUTPATIENT
Start: 2023-11-17 | End: 2023-11-17

## 2023-11-17 RX ORDER — HYDROMORPHONE HCL IN WATER/PF 6 MG/30 ML
0.2 PATIENT CONTROLLED ANALGESIA SYRINGE INTRAVENOUS
Status: DISCONTINUED | OUTPATIENT
Start: 2023-11-17 | End: 2023-11-21 | Stop reason: HOSPADM

## 2023-11-17 RX ORDER — LEVOTHYROXINE SODIUM 100 UG/1
100 TABLET ORAL DAILY
Status: DISCONTINUED | OUTPATIENT
Start: 2023-11-18 | End: 2023-11-21 | Stop reason: HOSPADM

## 2023-11-17 RX ORDER — CEFAZOLIN SODIUM 1 G/3ML
1 INJECTION, POWDER, FOR SOLUTION INTRAMUSCULAR; INTRAVENOUS EVERY 8 HOURS
Qty: 10 ML | Refills: 0 | Status: COMPLETED | OUTPATIENT
Start: 2023-11-17 | End: 2023-11-18

## 2023-11-17 RX ORDER — BISACODYL 10 MG
10 SUPPOSITORY, RECTAL RECTAL DAILY PRN
Status: DISCONTINUED | OUTPATIENT
Start: 2023-11-17 | End: 2023-11-21 | Stop reason: HOSPADM

## 2023-11-17 RX ORDER — FAMOTIDINE 20 MG/1
20 TABLET, FILM COATED ORAL 2 TIMES DAILY
Status: DISCONTINUED | OUTPATIENT
Start: 2023-11-17 | End: 2023-11-18

## 2023-11-17 RX ORDER — ACETAMINOPHEN 325 MG/1
650 TABLET ORAL EVERY 4 HOURS PRN
Qty: 100 TABLET | Refills: 0 | Status: SHIPPED | OUTPATIENT
Start: 2023-11-17

## 2023-11-17 RX ORDER — AMOXICILLIN 250 MG
1-2 CAPSULE ORAL 2 TIMES DAILY
Qty: 30 TABLET | Refills: 0 | Status: SHIPPED | OUTPATIENT
Start: 2023-11-17

## 2023-11-17 RX ORDER — ONDANSETRON 2 MG/ML
INJECTION INTRAMUSCULAR; INTRAVENOUS PRN
Status: DISCONTINUED | OUTPATIENT
Start: 2023-11-17 | End: 2023-11-17

## 2023-11-17 RX ORDER — HYDROMORPHONE HCL IN WATER/PF 6 MG/30 ML
0.4 PATIENT CONTROLLED ANALGESIA SYRINGE INTRAVENOUS
Status: DISCONTINUED | OUTPATIENT
Start: 2023-11-17 | End: 2023-11-21 | Stop reason: HOSPADM

## 2023-11-17 RX ORDER — ACETAMINOPHEN 325 MG/1
650 TABLET ORAL EVERY 4 HOURS PRN
Status: DISCONTINUED | OUTPATIENT
Start: 2023-11-20 | End: 2023-11-21 | Stop reason: HOSPADM

## 2023-11-17 RX ORDER — PROPOFOL 10 MG/ML
INJECTION, EMULSION INTRAVENOUS CONTINUOUS PRN
Status: DISCONTINUED | OUTPATIENT
Start: 2023-11-17 | End: 2023-11-17

## 2023-11-17 RX ORDER — ACETAMINOPHEN 325 MG/1
975 TABLET ORAL ONCE
Status: COMPLETED | OUTPATIENT
Start: 2023-11-17 | End: 2023-11-17

## 2023-11-17 RX ORDER — BUPIVACAINE HYDROCHLORIDE 7.5 MG/ML
INJECTION, SOLUTION INTRASPINAL
Status: COMPLETED | OUTPATIENT
Start: 2023-11-17 | End: 2023-11-17

## 2023-11-17 RX ORDER — ASPIRIN 81 MG/1
81 TABLET ORAL 2 TIMES DAILY
Status: DISCONTINUED | OUTPATIENT
Start: 2023-11-17 | End: 2023-11-21 | Stop reason: HOSPADM

## 2023-11-17 RX ADMIN — BUPIVACAINE HYDROCHLORIDE IN DEXTROSE 1.8 ML: 7.5 INJECTION, SOLUTION SUBARACHNOID at 10:35

## 2023-11-17 RX ADMIN — MIDAZOLAM 1 MG: 1 INJECTION INTRAMUSCULAR; INTRAVENOUS at 10:31

## 2023-11-17 RX ADMIN — ACETAMINOPHEN 1000 MG: 500 TABLET, FILM COATED ORAL at 09:32

## 2023-11-17 RX ADMIN — SODIUM CHLORIDE, POTASSIUM CHLORIDE, SODIUM LACTATE AND CALCIUM CHLORIDE: 600; 310; 30; 20 INJECTION, SOLUTION INTRAVENOUS at 10:28

## 2023-11-17 RX ADMIN — FENTANYL CITRATE 50 MCG: 50 INJECTION, SOLUTION INTRAMUSCULAR; INTRAVENOUS at 10:31

## 2023-11-17 RX ADMIN — LIDOCAINE HYDROCHLORIDE 1 ML: 10 INJECTION, SOLUTION EPIDURAL; INFILTRATION; INTRACAUDAL; PERINEURAL at 10:32

## 2023-11-17 RX ADMIN — HYDROCODONE BITARTRATE AND ACETAMINOPHEN 1 TABLET: 5; 325 TABLET ORAL at 15:14

## 2023-11-17 RX ADMIN — TRANEXAMIC ACID 1950 MG: 650 TABLET ORAL at 09:32

## 2023-11-17 RX ADMIN — FAMOTIDINE 20 MG: 20 TABLET ORAL at 20:27

## 2023-11-17 RX ADMIN — DOCUSATE SODIUM 50 MG AND SENNOSIDES 8.6 MG 1 TABLET: 8.6; 5 TABLET, FILM COATED ORAL at 20:27

## 2023-11-17 RX ADMIN — Medication 2 G: at 10:30

## 2023-11-17 RX ADMIN — ASPIRIN 81 MG: 81 TABLET, COATED ORAL at 20:27

## 2023-11-17 RX ADMIN — BUPIVACAINE HYDROCHLORIDE 15 ML: 5 INJECTION, SOLUTION EPIDURAL; INTRACAUDAL at 10:15

## 2023-11-17 RX ADMIN — CEFAZOLIN 1 G: 1 INJECTION, POWDER, FOR SOLUTION INTRAMUSCULAR; INTRAVENOUS at 15:11

## 2023-11-17 RX ADMIN — SODIUM CHLORIDE, POTASSIUM CHLORIDE, SODIUM LACTATE AND CALCIUM CHLORIDE: 600; 310; 30; 20 INJECTION, SOLUTION INTRAVENOUS at 13:57

## 2023-11-17 RX ADMIN — PHENYLEPHRINE HYDROCHLORIDE 0.5 MCG/KG/MIN: 10 INJECTION INTRAVENOUS at 10:48

## 2023-11-17 RX ADMIN — PROPOFOL 100 MCG/KG/MIN: 10 INJECTION, EMULSION INTRAVENOUS at 10:41

## 2023-11-17 RX ADMIN — DEXAMETHASONE SODIUM PHOSPHATE 10 MG: 4 INJECTION, SOLUTION INTRA-ARTICULAR; INTRALESIONAL; INTRAMUSCULAR; INTRAVENOUS; SOFT TISSUE at 10:49

## 2023-11-17 RX ADMIN — ONDANSETRON 4 MG: 2 INJECTION INTRAMUSCULAR; INTRAVENOUS at 12:06

## 2023-11-17 ASSESSMENT — ACTIVITIES OF DAILY LIVING (ADL)
ADLS_ACUITY_SCORE: 24
ADLS_ACUITY_SCORE: 31
ADLS_ACUITY_SCORE: 31
ADLS_ACUITY_SCORE: 32
ADLS_ACUITY_SCORE: 31
FALL_HISTORY_WITHIN_LAST_SIX_MONTHS: NO
ADLS_ACUITY_SCORE: 33
ADLS_ACUITY_SCORE: 24
ADLS_ACUITY_SCORE: 31

## 2023-11-17 NOTE — ANESTHESIA PROCEDURE NOTES
Adductor canal (Femoral via Adductor Canal) Procedure Note    Pre-Procedure   Staff -        Anesthesiologist:  Jasvir Hardy MD       Performed By: Anesthesiologist       Location: pre-op       Procedure Start/Stop Times: 11/17/2023 10:10 AM and 11/17/2023 10:15 AM       Pre-Anesthestic Checklist: patient identified, IV checked, site marked, risks and benefits discussed, informed consent, monitors and equipment checked, pre-op evaluation, at physician/surgeon's request and post-op pain management  Timeout:       Correct Patient: Yes        Correct Procedure: Yes        Correct Site: Yes        Correct Position: Yes        Correct Laterality: Yes        Site Marked: Yes  Procedure Documentation  Procedure: Adductor canal (Femoral via Adductor Canal)       Laterality: right       Patient Position: supine       Patient Prep/Sterile Barriers: sterile gloves, mask       Skin prep: Chloraprep       Local skin infiltrated with 2 mL of 1% lidocaine.        Needle Type: insulated and short bevel       Needle Gauge: 21.        Needle Length (Inches): 4        Ultrasound guided       1. Ultrasound was used to identify targeted nerve, plexus, vascular marker, or fascial plane and place a needle adjacent to it in real-time.       2. Ultrasound was used to visualize the spread of anesthetic in close proximity to the above referenced structure.       3. A permanent image is entered into the patient's record.       4. The visualized anatomic structures appeared normal.       5. There were no apparent abnormal pathologic findings.    Assessment/Narrative         The placement was negative for: blood aspirated, painful injection and site bleeding       Paresthesias: No.       Test dose of mL at.         Test dose negative, 3 minutes after injection, for signs of intravascular, subdural, or intrathecal injection.       Bolus given via needle..        Secured via.        Insertion/Infusion Method: Single Shot       Complications:  "none       Injection made incrementally with aspirations every 5 mL.    Medication(s) Administered   Bupivacaine 0.5% w/ 1:400K Epi (Injection) - Injection   15 mL - 11/17/2023 10:15:00 AM  Medication Administration Time: 11/17/2023 10:10 AM     Comments:  15 mL 0.5% Bupivacaine with 1:400K Epi in incremental doses with negative intermittent aspirations.      Pt mildly sedated, but communicative throughout.   Pt tolerated well.    No complications.      FOR [facility] ([facility]) ONLY:   Pain Team Contact information: please page the Pain Team Via [employer]. Search \"Pain\". During daytime hours, please page the attending first. At night please page the resident first.      "

## 2023-11-17 NOTE — PROCEDURES
OPERATIVE REPORT - TKA    DATE OF SERVICE:  11/17/2023    ATTENDING: JORDAN SIFUENTES    SURGEON:  JORDAN SIFUENTES    ASSISTANT:    Reynold Smith PA-C    PREOPERATIVE DIAGNOSIS:  Right knee osteoarthritis    POSTOPERATIVE DIAGNOSIS:   Same     PROCEDURES PERFORMED:   Right Total Knee Arthroplasty.      ANESTHESIA:  1. Spinal  2. Adductor canal block    ESTIMATED BLOOD LOSS:   25 mL    TRANSFUSIONS:  none    FLUIDS:   Per anesthesia    SPECIMENS:  Arthroplasty resection materials.    DRAIN:  None    COMPLICATIONS:  None    TOURNIQUET TIME:  45 minutes at 220 mmHg    INDICATIONS:   The patient is a very pleasant 83 year old female who has had persistent complaints of knee pain for some time now. The pain interferes with activities of daily living including sitting, standing, and ambulating short distances. The physical examination showed a painful and limited range of motion of the right knee.  The x-ray showed bone-on-bone arthritis of the right knee.     The patient has tried nonoperative measures to control their pain including Tylenol, oral anti-inflammatories, activity modification, low impact exercises, assistive devices, injections, none of which have provided satisfactory pain relief. The patient desires joint replacement of the knee to improve their lifestyle and reduce their pain.      Preoperatively, the risks, benefits, and possible complications of the procedure were discussed. The risks discussed included but were not limited to wear, loosening, infection, neurovascular damage, thromboembolic disease, foot drop, limb length inequality, persistent pain, stiffness and dislocation. All of the questions were satisfactorily answered and the patient wished to proceed with joint replacement surgery to improve their lifestyle and reduce their pain.       IMPLANTS:  1. Femoral component:  [place] Triathlon CR Right size 3  2. Tibial component: [place] Triathlon [place] size 4   3. Poly insert:   [place] Triathlon X3 CS 9 mm  4. Patella: [place] Triathlon 36 x 10 mm symmetric  5. Bone cement:  Simplex    PROCEDURE  The patient was brought to the operating room after adequate induction of Anesthesia. The patient had a tourniquet placed on the thigh and the lower extremity were prepped and draped free in the usual sterile fashion using a Betadine scrub and ChloraPrep paint.    At this point a time-out procedure was carried out to identify the patient, the appropriate operative side, the implants, the code status, the fire risk, the preoperative medications and to confirm that the patient received 2 grams of ancef within one hour of the onset of the procedure.  Following completion of this, we proceeded with the case    The leg was elevated and exsanguinated, flexed to 90 degrees and the tourniquet was inflated to 220 mmHg. A midline incision was performed. This allowed creation of full thickness subcutaneous flaps.  A midvastus arthrotomy was used.  The meniscal tibial ligaments were released as feasible medially and laterally. The medial collateral ligament was subperiosteally elevated to the origin of the semimembranosus. The accessible anterior menisci were resected. The retropatellar fat pad was partially excised. The patella was dislocated into the lateral gutter and the knee was flexed to 90 degrees.     With the knee at 90 degrees flexion, the femoral intramedullary guide chiquis was placed and set for 5 degrees of valgus and impacted into position. The distal femoral resection was made.  The femur was sized to a 3, the 4-in-1 block was pinned in place with the appropriate amount of external rotation matching the transepicondylar axis and perpendicular to [place]'s line.  The anterior posterior and chamfer osteotomies were completed. The residual osteophytes were removed from the periphery of the femur.     The tibia stabilized in an anterior subluxed position and neutral sagittal alignment. The  intramedullary cutting apparatus was placed down. We pinned the block in place, performed the proximal tibial resection using the oscillating saw and cutting block with 3 degrees of posterior inclination.  The tibial resection was checked with the intramedullary based checking apparatus and rasp. The tibia was sized to a 4. We put the tibial component in the appropriate amount of external rotation with the center between the middle and medial thirds of the tibial tuberosity. The proximal tibia was filled with morselized bone graft to permit cement extravasation.     The posterior osteophytes were resected using the curved half inch osteotome. The patella was everted. The patellar osteotomy, free hand, using the oscillating saw. Multiple quadrants were checked multiple times until the appropriate thickness was confirmed with the caliper.  The patella sized for a 36 mm patella. The patella was drilled through the patellar template.    The trial components were placed. The leg came out to full extension and was nicely balanced with a 9 mm CS insert.  The knee was stable to varus and valgus stress in full extension. and mid flexion.  Our patellar tracking was checked.  The patella showed no tilt or subluxation and the patella engaging both condyles at 90 degrees. Satisfied with the patellar alignment, without any need for further releases, we removed the trial components. We prepared the bony surface for cementing.    The bony surfaces of the femur, tibia and patella were prepared for cementing.  Pulse irrigation was used on the femur, tibia and patella. The surfaces were then gauze dried. The femur, tibia and patella were cemented in place using vacuum mixed cement. Alignment, Range of Motion, Stability and Patellar tracking were appropriate.    The wound was closed in a layered fashion. The skin was brought together, everted and approximated with staples. Sterile dressings were applied. The patient was awakened and  transported postanesthesia care in stable condition at the end of the case. Sponge and needle counts were correct.     Hemostasis was obtained throughout the procedure and prior to the closure of each layer using electrocautery. Pulsatile irrigation and dilute betadine irrigation were used throughout the procedure. Surgical Body Exhaust Systems and high exchange air flow were used during the procedure. The patient received 2 grams of ancef prior to tourniquet inflation and within 1 hours of the start of the procedure for antibiotic prophylaxis.    POSTOPERATIVE PLAN  1. WBAT on the right lower extremity.   2. Antibiotic Prophylaxis were given 2 grams of ancef. Antibiotics were given within 1 hour of surgical incision and discontinued within 24 hours.  3. Pain control with Norco  4. Follow up with Dr. Beck in 10-14 days. [phone].   5.  DVT prophylaxis aspirin and sequential compression devices will be started within 24 hours of surgery.  6. Plan discharge when meeting therapy goals and patient is medically stable  7. Caution with NSAID usage.  8. Patient planning for discharge to skilled nursing facility    Darrell Beck MD  [facility]  [phone]  [phone]  Primary Care Physician Claire Hearn

## 2023-11-17 NOTE — ANESTHESIA CARE TRANSFER NOTE
Patient: Kirsten Cortés    Procedure: Procedure(s):  RIGHT TOTAL KNEE ARTHROPLASTY       Diagnosis: Osteoarthritis of right knee [M17.11]  Diagnosis Additional Information: No value filed.    Anesthesia Type:   Spinal     Note:    Oropharynx: oropharynx clear of all foreign objects and spontaneously breathing  Level of Consciousness: awake  Oxygen Supplementation: nasal cannula  Level of Supplemental Oxygen (L/min / FiO2): 2  Independent Airway: airway patency satisfactory and stable  Dentition: dentition unchanged  Vital Signs Stable: post-procedure vital signs reviewed and stable  Report to RN Given: handoff report given  Patient transferred to: PACU    Handoff Report: Identifed the Patient, Identified the Reponsible Provider, Reviewed the pertinent medical history, Discussed the surgical course, Reviewed Intra-OP anesthesia mangement and issues during anesthesia, Set expectations for post-procedure period and Allowed opportunity for questions and acknowledgement of understanding      Vitals:  Vitals Value Taken Time   /55    Temp     Pulse 73 11/17/23 1231   Resp 16 11/17/23 1231   SpO2 94 % 11/17/23 1231   Vitals shown include unfiled device data.    Electronically Signed By: SIERRA Corbett CRNA  November 17, 2023  12:33 PM

## 2023-11-17 NOTE — ANESTHESIA PROCEDURE NOTES
"Intrathecal injection Procedure Note    Pre-Procedure   Staff -        Anesthesiologist:  Jasvir Hardy MD       Performed By: Anesthesiologist       Location: OR       Procedure Start/Stop Times: 11/17/2023 10:35 AM and 11/17/2023 10:40 AM       Pre-Anesthestic Checklist: patient identified, IV checked, risks and benefits discussed, informed consent, monitors and equipment checked, pre-op evaluation and at physician/surgeon's request  Timeout:       Correct Patient: Yes        Correct Procedure: Yes        Correct Site: Yes        Correct Position: Yes   Procedure Documentation  Procedure: intrathecal injection       Patient Position: sitting       Patient Prep/Sterile Barriers: sterile gloves, mask, patient draped       Skin prep: Chloraprep       Insertion Site: L3-4. (midline approach).       Needle Gauge: 25.        Needle Length (Inches): 3.5        Spinal Needle Type: Abi tip       Introducer used       Introducer: 20 G       # of attempts: 1 and  # of redirects:  1    Assessment/Narrative         Paresthesias: No.       CSF fluid: clear.    Medication(s) Administered   0.75% Hyperbaric Bupivacaine (Intrathecal) - Intrathecal   1.8 mL - 11/17/2023 10:35:00 AM  Medication Administration Time: 11/17/2023 10:35 AM     Comments:  Pt mildly sedated, but communicative throughout procedure.  Pt tolerated well.    No complications.       FOR [facility] ([facility]) ONLY:   Pain Team Contact information: please page the Pain Team Via [employer]. Search \"Pain\". During daytime hours, please page the attending first. At night please page the resident first.      "

## 2023-11-17 NOTE — PROGRESS NOTES
Chart check:  Consult received for med rec. Patient is a 83-year-old female who underwent right total knee arthroplasty earlier today.  She is otherwise pretty healthy with mild cognitive impairment that is in the process of being worked up as outpatient, has anxiety, hyperlipidemia, vitamin B12 deficiency and hypothyroidism.  Prior to admission medications resumed.  She can continue all of those medication at discharge.  Hemodynamically stable.  Discussed with bedside RN who had no questions.  We will not formally see her at the moment however if any questions or issue arises please do not hesitate to call us anytime.  Hospitalist service will sign off at the moment.  Updated bedside RN.

## 2023-11-17 NOTE — ANESTHESIA POSTPROCEDURE EVALUATION
Patient: Kirsten Cortés    Procedure: Procedure(s):  RIGHT TOTAL KNEE ARTHROPLASTY       Anesthesia Type:  Spinal    Note:  Disposition: Admission   Postop Pain Control: Uneventful            Sign Out: Well controlled pain   PONV: No   Neuro/Psych: Uneventful            Sign Out: Acceptable/Baseline neuro status   Airway/Respiratory: Uneventful            Sign Out: Acceptable/Baseline resp. status   CV/Hemodynamics: Uneventful            Sign Out: Acceptable CV status; No obvious hypovolemia; No obvious fluid overload   Other NRE: NONE   DID A NON-ROUTINE EVENT OCCUR? No           Last vitals:  Vitals Value Taken Time   /66 11/17/23 1300   Temp 36.3  C (97.4  F) 11/17/23 1306   Pulse 71 11/17/23 1315   Resp 12 11/17/23 1315   SpO2 96 % 11/17/23 1315   Vitals shown include unfiled device data.    Electronically Signed By: Jasvir Hardy MD  November 17, 2023  1:16 PM

## 2023-11-18 ENCOUNTER — APPOINTMENT (OUTPATIENT)
Dept: PHYSICAL THERAPY | Facility: CLINIC | Age: 83
DRG: 470 | End: 2023-11-18
Attending: ORTHOPAEDIC SURGERY
Payer: MEDICARE

## 2023-11-18 ENCOUNTER — APPOINTMENT (OUTPATIENT)
Dept: OCCUPATIONAL THERAPY | Facility: CLINIC | Age: 83
DRG: 470 | End: 2023-11-18
Attending: SPECIALIST/TECHNOLOGIST
Payer: MEDICARE

## 2023-11-18 PROBLEM — Z96.651 S/P TOTAL KNEE ARTHROPLASTY, RIGHT: Status: ACTIVE | Noted: 2023-11-18

## 2023-11-18 LAB
GLUCOSE SERPL-MCNC: 141 MG/DL (ref 70–99)
HGB BLD-MCNC: 10.7 G/DL (ref 11.7–15.7)

## 2023-11-18 PROCEDURE — 250N000013 HC RX MED GY IP 250 OP 250 PS 637: Performed by: INTERNAL MEDICINE

## 2023-11-18 PROCEDURE — 250N000011 HC RX IP 250 OP 636: Performed by: SPECIALIST/TECHNOLOGIST

## 2023-11-18 PROCEDURE — 97110 THERAPEUTIC EXERCISES: CPT | Mod: GP

## 2023-11-18 PROCEDURE — 85018 HEMOGLOBIN: CPT | Performed by: SPECIALIST/TECHNOLOGIST

## 2023-11-18 PROCEDURE — 120N000001 HC R&B MED SURG/OB

## 2023-11-18 PROCEDURE — 97116 GAIT TRAINING THERAPY: CPT | Mod: GP

## 2023-11-18 PROCEDURE — 250N000013 HC RX MED GY IP 250 OP 250 PS 637: Performed by: SPECIALIST/TECHNOLOGIST

## 2023-11-18 PROCEDURE — 82947 ASSAY GLUCOSE BLOOD QUANT: CPT | Performed by: ORTHOPAEDIC SURGERY

## 2023-11-18 PROCEDURE — 97535 SELF CARE MNGMENT TRAINING: CPT | Mod: GO

## 2023-11-18 PROCEDURE — 97165 OT EVAL LOW COMPLEX 30 MIN: CPT | Mod: GO

## 2023-11-18 PROCEDURE — 36415 COLL VENOUS BLD VENIPUNCTURE: CPT | Performed by: ORTHOPAEDIC SURGERY

## 2023-11-18 RX ORDER — FAMOTIDINE 20 MG/1
20 TABLET, FILM COATED ORAL DAILY
Status: DISCONTINUED | OUTPATIENT
Start: 2023-11-19 | End: 2023-11-21 | Stop reason: HOSPADM

## 2023-11-18 RX ADMIN — ASPIRIN 81 MG: 81 TABLET, COATED ORAL at 08:50

## 2023-11-18 RX ADMIN — DOCUSATE SODIUM 50 MG AND SENNOSIDES 8.6 MG 1 TABLET: 8.6; 5 TABLET, FILM COATED ORAL at 08:50

## 2023-11-18 RX ADMIN — CEFAZOLIN 1 G: 1 INJECTION, POWDER, FOR SOLUTION INTRAMUSCULAR; INTRAVENOUS at 00:06

## 2023-11-18 RX ADMIN — FLUOXETINE 40 MG: 20 CAPSULE ORAL at 08:51

## 2023-11-18 RX ADMIN — HYDROCODONE BITARTRATE AND ACETAMINOPHEN 1 TABLET: 5; 325 TABLET ORAL at 18:31

## 2023-11-18 RX ADMIN — HYDROCODONE BITARTRATE AND ACETAMINOPHEN 1 TABLET: 5; 325 TABLET ORAL at 11:30

## 2023-11-18 RX ADMIN — FAMOTIDINE 20 MG: 20 TABLET ORAL at 08:50

## 2023-11-18 RX ADMIN — DOCUSATE SODIUM 50 MG AND SENNOSIDES 8.6 MG 1 TABLET: 8.6; 5 TABLET, FILM COATED ORAL at 21:47

## 2023-11-18 RX ADMIN — LEVOTHYROXINE SODIUM 100 MCG: 100 TABLET ORAL at 05:56

## 2023-11-18 RX ADMIN — HYDROCODONE BITARTRATE AND ACETAMINOPHEN 1 TABLET: 5; 325 TABLET ORAL at 04:59

## 2023-11-18 RX ADMIN — ASPIRIN 81 MG: 81 TABLET, COATED ORAL at 21:47

## 2023-11-18 RX ADMIN — ATORVASTATIN CALCIUM 40 MG: 20 TABLET, FILM COATED ORAL at 08:51

## 2023-11-18 ASSESSMENT — ACTIVITIES OF DAILY LIVING (ADL)
ADLS_ACUITY_SCORE: 28
ADLS_ACUITY_SCORE: 31
ADLS_ACUITY_SCORE: 28
PREVIOUS_RESPONSIBILITIES: MEAL PREP;HOUSEKEEPING;LAUNDRY;SHOPPING;MEDICATION MANAGEMENT;FINANCES;DRIVING

## 2023-11-18 NOTE — PROGRESS NOTES
"   11/17/23 1700   Appointment Info   Signing Clinician's Name / Credentials (PT) Yeimy Alvarez DPT   Living Environment   People in Home alone   Current Living Arrangements independent living facility   Home Accessibility no concerns   Transportation Anticipated family or friend will provide   Living Environment Comments Pt is living in a senior co-op building in an apartment alone where she recieves one meal a day. Pt can have assist with cleaning at home but otherwise has no assist.   Self-Care   Usual Activity Tolerance good   Current Activity Tolerance fair   Regular Exercise No   Equipment Currently Used at Home raised toilet seat;walker, rolling;grab bar, toilet   Fall history within last six months yes   Number of times patient has fallen within last six months 3   Activity/Exercise/Self-Care Comment Pt is IND with all mobility and ADL's at baseline w/ 4WW usage. Pt reports having multiple falls recently at home.   General Information   Onset of Illness/Injury or Date of Surgery 11/20/23   Referring Physician Anseth, Scott Duane, MD   Patient/Family Therapy Goals Statement (PT) \"To go back home\"   Pertinent History of Current Problem (include personal factors and/or comorbidities that impact the POC) Pt is a 83-year-old female who underwent right total knee arthroplasty on 11/17/23. She is otherwise pretty healthy with mild cognitive impairment that is in the process of being worked up as outpatient, has anxiety, hyperlipidemia, vitamin B12 deficiency and hypothyroidism.   Existing Precautions/Restrictions fall;weight bearing   Weight-Bearing Status - LUE weight-bearing as tolerated;full weight-bearing   Weight-Bearing Status - RUE full weight-bearing   Weight-Bearing Status - LLE full weight-bearing   Weight-Bearing Status - RLE weight-bearing as tolerated   Cognition   Affect/Mental Status (Cognition) WFL;confused   Orientation Status (Cognition) oriented x 4   Follows Commands (Cognition) WFL "   Cognitive Status Comments Pt demonstrated slight confusion throughout session, required multiple cues to redirect at times   Pain Assessment   Patient Currently in Pain Yes, see Vital Sign flowsheet  (Pt reports slight pain and pressure in R knee)   Integumentary/Edema   Integumentary/Edema other (describe)   Integumentary/Edema Comments Post-operative swelling in R knee   Posture    Posture Forward head position;Protracted shoulders   Range of Motion (ROM)   Range of Motion ROM deficits secondary to surgical procedure;ROM deficits secondary to pain;ROM deficits secondary to swelling;ROM deficits secondary to weakness   Strength (Manual Muscle Testing)   Strength (Manual Muscle Testing) Able to perform R SLR;Able to perform L SLR;Deficits observed during functional mobility   Bed Mobility   Comment, (Bed Mobility) Supine>sitting EOB completed w/ CGA   Transfers   Comment, (Transfers) Sit>stand completed w/ CGA   Gait/Stairs (Locomotion)   Comment, (Gait/Stairs) Pt ambulated w/ 4WW and CGA-Min Ax1   Balance   Balance other (describe)   Balance Comments Pt demonstrated good sitting balance, required 4WW for static standing and dynamic balance   Sensory Examination   Sensory Perception patient reports no sensory changes  (Pt reports slight numbness in R foot still)   Coordination   Coordination no deficits were identified   Muscle Tone   Muscle Tone no deficits were identified   Clinical Impression   Criteria for Skilled Therapeutic Intervention Yes, treatment indicated   PT Diagnosis (PT) Impaired functional mobility   Influenced by the following impairments Post-surgical pain, weakness, impaired activity tolerance, decreased balance   Functional limitations due to impairments Impaired gait and inability to complete ADL's   Clinical Presentation (PT Evaluation Complexity) stable   Clinical Presentation Rationale Clinical judgment   Clinical Decision Making (Complexity) low complexity   Planned Therapy Interventions  (PT) balance training;bed mobility training;gait training;home exercise program;motor coordination training;neuromuscular re-education;patient/family education;postural re-education;ROM (range of motion);strengthening;stair training;stretching;transfer training;progressive activity/exercise;risk factor education;home program guidelines   Risk & Benefits of therapy have been explained evaluation/treatment results reviewed;care plan/treatment goals reviewed;risks/benefits reviewed;current/potential barriers reviewed;participants voiced agreement with care plan;participants included;patient   PT Total Evaluation Time   PT Eval, Low Complexity Minutes (86808) 10   Physical Therapy Goals   PT Frequency Daily   PT Predicted Duration/Target Date for Goal Attainment 11/24/23   PT Goals Bed Mobility;Transfers;Gait   PT: Bed Mobility Independent;Supine to/from sit;Rolling;Bridging;Within precautions   PT: Transfers Modified independent;Sit to/from stand;Bed to/from chair;Assistive device;Within precautions   PT: Gait Modified independent;Rolling walker;Greater than 200 feet;Within precautions   Interventions   Interventions Quick Adds Gait Training;Therapeutic Activity;Therapeutic Procedure   Therapeutic Procedure/Exercise   Ther. Procedure: strength, endurance, ROM, flexibillity Minutes (67007) 3   Treatment Detail/Skilled Intervention Pt instructed in and completed ankle pumps x 10 reps for increased circulation benefits.   Therapeutic Activity   Therapeutic Activities: dynamic activities to improve functional performance Minutes (04717) 10   Treatment Detail/Skilled Intervention Evaluaiton completed and treatment indicated. Pt educated on WBAT status. Once sitting EOB, pt cued to lock brakes on 4WW and to push up from bed rather than 4WW when standing. Post-ambulation, pt educated on locking brakes and reaching back for bed when movign to sitting. Sit>supine completed w/ CGA, cues given for positioning in bed. Pt given  step by step instruction how to scoot up in bed, pt demonstrated confusion regarding initiation of the task. Pt educated on icing and pain managment, reported increased pain following activity. Pt left supine in bed with all needs in reach and bed alarm on. RN notified.   Gait Training   Gait Training Minutes (42552) 10   Symptoms Noted During/After Treatment (Gait Training) increased pain;fatigue   Treatment Detail/Skilled Intervention Pt ambulated w/ 4WW and CGA-Min Ax1 for ~120 ft. Pt cued for walker proximity, upright posture and heel-toe gait to promote increased step length. Pt had slight LOB when turning, required Min Ax1 to recover.   Distance in Feet 120 ft   Weight Bearing (Gait Training) weight-bearing as tolerated   Assistive Device (Gait Training) rolling walker   PT Discharge Planning   PT Plan Progress ambulation distance w/ 4WW, progress LE exercises in supine and sitting, adjust 4WW handles as L side is higher than R side   PT Discharge Recommendation (DC Rec)   (Defer to Ortho)   PT Rationale for DC Rec Pt is moving below baseline mobility, currently requiring Ax1 for safety with functional mobility at this time. Pt is typically Mod I w/ 4WW for functional mobility at baseline but reports having multiple falls at home lately. Pt lives alone in a senior apartment but reports only recieving assist with one meal and cleaning if needed. Recommend a TCU stay prior to returning home alone in order for pt to increase strength and balance prior to returning home due to increased falls risk at home at this time.   PT Brief overview of current status CGA-Min Ax1 w/ 4WW for all functional mobility   Total Session Time   Timed Code Treatment Minutes 23   Total Session Time (sum of timed and untimed services) 33                                                                                 [facility]      OUTPATIENT PHYSICAL THERAPY EVALUATION  PLAN OF TREATMENT FOR OUTPATIENT  REHABILITATION  (COMPLETE FOR INITIAL CLAIMS ONLY)  Patient's Last Name, First Name, M.I.  YOB: 1940  JetKirsten anna                        Provider's Name  [facility] Medical Record No.  [id number]                               Onset Date:  11/20/23   Start of Care Date:         Type:     _X_PT   ___OT   ___SLP Medical Diagnosis:                           PT Diagnosis:  Impaired functional mobility   Visits from SOC:  1   _________________________________________________________________________________  Plan of Treatment/Functional Goals    Planned Interventions: balance training, bed mobility training, gait training, home exercise program, motor coordination training, neuromuscular re-education, patient/family education, postural re-education, ROM (range of motion), strengthening, stair training, stretching, transfer training, progressive activity/exercise, risk factor education, home program guidelines     Goals: See Physical Therapy Goals on Care Plan in [employer] electronic health record.    Therapy Frequency: Daily  Predicted Duration of Therapy Intervention: 11/24/23  _________________________________________________________________________________    I CERTIFY THE NEED FOR THESE SERVICES FURNISHED UNDER        THIS PLAN OF TREATMENT AND WHILE UNDER MY CARE     (Physician attestation of this document indicates review and certification of the therapy plan).               ,      Referring Physician: Anseth, Scott Duane, MD            Initial Assessment        See Physical Therapy evaluation dated   in Epic electronic health record.

## 2023-11-18 NOTE — PROGRESS NOTES
11/18/23 0814   Appointment Info   Signing Clinician's Name / Credentials (OT) Chacha Spain   Student Supervision Line of sight supervision provided;Therapy services provided with the co-signing licensed therapist guiding and directing the services, and providing the skilled judgement and assessment throughout the session   Living Environment   People in Home alone   Current Living Arrangements independent living facility   Home Accessibility no concerns   Transportation Anticipated family or friend will provide   Living Environment Comments pt is living in a senior living co-op; pt gets one meal a day and prepares her other two meals, has walk in shower with grab bars, has raised toilet seat w toilet grab bars   Self-Care   Usual Activity Tolerance good   Current Activity Tolerance fair   Regular Exercise No   Equipment Currently Used at Home raised toilet seat;walker, rolling;grab bar, toilet;grab bar, tub/shower   Fall history within last six months yes   Number of times patient has fallen within last six months 4   Activity/Exercise/Self-Care Comment Pt previously Ind w all ADLs at baseline. Pt reporting multiple falls and could not report what happened in most falls.   Instrumental Activities of Daily Living (IADL)   Previous Responsibilities meal prep;housekeeping;laundry;shopping;medication management;finances;driving   IADL Comments Pt recieves one meal a day from co-op and prepares other two, has housekeeping service she can call as needed, drives   General Information   Onset of Illness/Injury or Date of Surgery 11/17/23   Referring Physician Ann-Marie Smith PA-C   Patient/Family Therapy Goal Statement (OT) go home   Additional Occupational Profile Info/Pertinent History of Current Problem Pt is a 83-year-old female who underwent right total knee arthroplasty on 11/17/23. She is otherwise pretty healthy with mild cognitive impairment that is in the process of being worked up as outpatient, has  anxiety, hyperlipidemia, vitamin B12 deficiency and hypothyroidism   Existing Precautions/Restrictions fall   Cognitive Status Examination   Orientation Status orientation to person, place and time   Follows Commands follows one-step commands;repetition of directions required   Safety Deficit minimal deficit;safety precautions awareness;safety precautions follow-through/compliance   Memory Deficit   (Pt reports not being able to recall what happens before falls)   Attention Deficit distractible in noisy environment;distractible in quiet environment   Executive Function Deficit insight/awareness of deficits   Cognitive Status Comments Pt presented as very distractible and chatty, not attending to walker safety, mildly impulsive   Cognitive Screens/Assessments   Cognitive Assessments Completed Blessed Orientation-Memory-Concentration   Blessed Orientation-Memory-Concentration Test:  Total Weighted Score out of 28 2   Blessed Orientation-Memory-Concentration Test Norms 0-8 equals normal to mild impairment   Blessed Orientation-Memory-Concentration Interpretation Pt successfully completed Short Blessed Test; was able to complete all items except for skipped June when naming months.   Sensory   Sensory Quick Adds sensation intact   Pain Assessment   Patient Currently in Pain Yes, see Vital Sign flowsheet   Range of Motion Comprehensive   General Range of Motion lower extremity range of motion deficits identified   Comment, General Range of Motion RLE limited by pain s/p TKA   Strength Comprehensive (MMT)   General Manual Muscle Testing (MMT) Assessment lower extremity strength deficits identified   Comment, General Manual Muscle Testing (MMT) Assessment RLE limited by pain/weakness s/p TKA   Coordination   Upper Extremity Coordination No deficits were identified   Bed Mobility   Bed Mobility supine-sit   Supine-Sit [place] (Bed Mobility) supervision   Transfers   Transfers sit-stand transfer   Sit-Stand Transfer    Sit-Stand [place] (Transfers) verbal cues;contact guard   Sit/Stand Transfer Comments VC for walker safety, CGA for balance   Balance   Balance Assessment standing static balance;standing dynamic balance   Position/Device Used, Standing Balance walker, 4-wheeled   Balance Comments mild unsteadiness in static standing   Activities of Daily Living   BADL Assessment/Intervention upper body dressing;lower body dressing;grooming   Upper Body Dressing Assessment/Training   Comment, (Upper Body Dressing) supervision more needed if dressing in standing   [place] Level (Upper Body Dressing) supervision   Lower Body Dressing Assessment/Training   Comment, (Lower Body Dressing) supervision more needed if dressing in standing   [place] Level (Lower Body Dressing) supervision   Grooming Assessment/Training   [place] Level (Grooming) contact guard assist   Comment, (Grooming) CGA for standing at sink during g/h   Clinical Impression   Criteria for Skilled Therapeutic Interventions Met (OT) Yes, treatment indicated   OT Diagnosis Dec I/ADL ind s/p R TKA   OT Problem List-Impairments impacting ADL problems related to;activity tolerance impaired;cognition;strength;balance;pain   Assessment of Occupational Performance 1-3 Performance Deficits   Identified Performance Deficits standing balance in all ADLs, transfers for walker safety   Planned Therapy Interventions (OT) ADL retraining;IADL retraining;balance training;ROM;strengthening;transfer training;visual perception;home program guidelines;progressive activity/exercise;risk factor education   Clinical Decision Making Complexity (OT) problem focused assessment/low complexity   Risk & Benefits of therapy have been explained evaluation/treatment results reviewed;care plan/treatment goals reviewed;risks/benefits reviewed;current/potential barriers reviewed;participants voiced agreement with care plan;participants included;patient   OT Total Evaluation Time   OT  [place], Low Complexity Minutes (75828) 10   OT Goals   Therapy Frequency (OT) 5 times/week   OT Predicted Duration/Target Date for Goal Attainment 11/24/23   OT Goals Upper Body Dressing;Lower Body Dressing;Hygiene/Grooming;Transfers;Cognition;Upper Body Bathing;Lower Body Bathing   OT: Hygiene/Grooming while standing;independent   OT: Upper Body Dressing Independent   OT: Lower Body Dressing Independent   OT: Upper Body Bathing Independent   OT: Lower Body Bathing Independent   OT: Transfer Independent;with assistive device  (w 4WW)   OT: Cognitive Patient/caregiver will verbalize understanding of cognitive assessment results/recommendations as needed for safe discharge planning   Interventions   Interventions Quick Adds Self-Care/Home Management   Self-Care/Home Management   Self-Care/Home Mgmt/ADL, Compensatory, Meal Prep Minutes (43536) 21   Symptoms Noted During/After Treatment (Meal Preparation/Planning Training) increased pain;fatigue   Treatment Detail/Skilled Intervention Pt greeted awake and alert supine in bed. Pt agreeable to therapy. Pt eager to get up and moving and talking fast throughout session. Pt supine<>sit EOB SBA. /60. Pt reporting pain as a 2-3/10. Pt donned/doffed socks SBA. Pt sit<>stand CGA/VC w 4WW; pt needing VC for walker safety and brake release consistently throughout session. Pt also attempted to abandon 4WW a few times throughout session. Pt ambulated to bathroom CGA. Pt stood at sink to brush teeth, wash face, comb hair CGA for balance and safety. LOB noticed a few times in standing, pt reported that's why she was repositioning her feet. Pt ambulated back to chair CGA w 4WW and VC for safety. Pt stand<>sit CGA w 4WW. Pt completed the Short Blessed Screen, see above for results. Pt sit<>stand w CGA and VC w FWW, pt educated on walker safety. Pt ambulated out of room and in hallway ~25 ft CGA w 4WW before reporting that she would like to put pants on. Pt ambulated ~25 ft back to  the room CGA w 4WW. Pt attempted to abandon walker to obtain clothes and was cued to attend to/use walker. Pt ambulated to chair CGA w 4WW. Pt stand<>sit w 4WW CGA/VC for safety. Pt donned pants SBA. Pt doffed hospital gown and donned shirt SBA. Pt left in chair w all needs met, handed off to PT.   OT Discharge Planning   OT Plan reinforce safety w/ ADL tasks, monitor cognition   OT Rationale for DC Rec Pt is below baseline s/p RTKA. Pt has had multiple falls recently and lives alone, and was previously Ind at baseline. Pt currently requiring CGA and VC for balance and walker safety in all ADLs. Recommend TCU to progress safety and independence for I/ADLs.   OT Brief overview of current status CGA mobility, CGA standing g/h   Total Session Time   Timed Code Treatment Minutes 21   Total Session Time (sum of timed and untimed services) 31

## 2023-11-18 NOTE — CONSULTS
Care Management Initial Consult    General Information  Assessment completed with: Kirsten Garrido  Type of CM/SW Visit: CM Role Introduction    Primary Care Provider verified and updated as needed: Yes   Readmission within the last 30 days:        Reason for Consult: discharge planning  Advance Care Planning:            Communication Assessment  Patient's communication style: spoken language (English or Bilingual)    Hearing Difficulty or Deaf: yes   Wear Glasses or Blind: no    Cognitive  Cognitive/Neuro/Behavioral: WDL  Level of Consciousness: alert     Orientation: oriented x 4  Mood/Behavior: calm, cooperative, behavior appropriate to situation     Speech: clear, spontaneous, logical    Living Environment:   People in home: alone     Current living Arrangements: apartment      Able to return to prior arrangements: yes       Family/Social Support:  Care provided by: self  Provides care for: no one  Marital Status:   Other (specify) (support from friends)          Description of Support System: Supportive    Support Assessment: Adequate family and caregiver support, Adequate social supports    Current Resources:   Patient receiving home care services:       Community Resources:    Equipment currently used at home: raised toilet seat, walker, rolling, grab bar, toilet, grab bar, tub/shower  Supplies currently used at home:      Employment/Financial:  Employment Status: retired        Financial Concerns:             Does the patient's insurance plan have a 3 day qualifying hospital stay waiver?  No    Lifestyle & Psychosocial Needs:  Social Determinants of Health     Food Insecurity: Not on file   Depression: Not on file   Housing Stability: Not on file   Tobacco Use: Medium Risk (11/16/2023)    Patient History     Smoking Tobacco Use: Former     Smokeless Tobacco Use: Never     Passive Exposure: Not on file   Financial Resource Strain: Not on file   Alcohol Use: Not on file   Transportation Needs: Not on  file   Physical Activity: Not on file   Interpersonal Safety: Not on file   Stress: Not on file   Social Connections: Not on file       Functional Status:  Prior to admission patient needed assistance:              Mental Health Status:          Chemical Dependency Status:                Values/Beliefs:  Spiritual, Cultural Beliefs, [employer] Practices, Values that affect care:                 Additional Information:  Consult for discharge planning. Patient admitted for osteoarthritis of right knee on 11/17/23 with tentative discharge date of 11/20/23. Writer reviewed chart and TCU recommendations at discharge.     Writer met with patient to complete consult at bedside and introduced self and role. Patient in agreement for TCU at discharge and would like referrals sent to [facility] ED as where she lives is affiliated with them. At baseline patient lives alone in a senior co-op and gets 1 meal per day prepared and has equipment at home. Writer confirmed patient's primary doctor is Dr. Hearn.    Writer discussed transportation options and possible out of pocket costs of both wheelchair and stretcher with patient. Patient would like to transport via  a friend at discharge.       TY Roblero

## 2023-11-18 NOTE — PROGRESS NOTES
ORTHOPEDIC LOWER EXTREMITY PROGRESS NOTE    POD#1  Patient is a 83 year old female who underwent Procedure(s):  RIGHT TOTAL KNEE ARTHROPLASTY on 11/17/2023. Pain is well controlled. No complaints this AM.  Still desires skilled nursing facility after discharge from the hospital    Vitals: VSS-AF    Drains:  none    EXAM   The patient is awake and alert.  Calves are soft and non-tender.   Sensation is intact.  Dorsiflexion and plantar flexion is intact.  Foot warm with nl cap refill.  The incision is covered.     Labs: No lab results found.  CX: none  ASSESSMENT  S/p Right TKA   PLAN  1. DVT prophylaxis: aspirin and sequential compression devices.  2. Weight Bearing: WBAT  3. Anticipated discharge date Monday . Discharge to skilled nursing facility.  4. Cont Pain Control: Norco  5. Follow up with Dr. Beck in 10-14 days

## 2023-11-18 NOTE — PLAN OF CARE
Goal Outcome Evaluation:      Plan of Care Reviewed With: patient    Overall Patient Progress: improvingOverall Patient Progress: improving     Patient vital signs are at baseline: Yes  Patient able to ambulate as they were prior to admission or with assist devices provided by therapies during their stay:  Yes  Patient MUST void prior to discharge:  Yes  Patient able to tolerate oral intake:  Yes  Pain has adequate pain control using Oral analgesics:  Yes  Does patient have an identified :  No,  Reason:  TCU  Has goal D/C date and time been discussed with patient:  No,  Reason: TCU

## 2023-11-18 NOTE — PLAN OF CARE
Goal Outcome Evaluation:    [id number]    Dx: R TKA  DOA: 11/17/2023  Mental Statues: A&O X4  Activity: Al with gait belt and walker   Diet: regular   Bowel/bladder: continent of bowel and bladder. Voiding in bathroom   Skin: Surgical incision is CDI.   Pain: PRN norco   LDAs: IV saline locked   Discharge: Pending     Patient vital signs are at baseline: Yes  Patient able to ambulate as they were prior to admission or with assist devices provided by therapies during their stay:  Yes  Patient MUST void prior to discharge:  Yes  Patient able to tolerate oral intake:  Yes  Pain has adequate pain control using Oral analgesics:  Yes  Does patient have an identified :  Yes  Has goal D/C date and time been discussed with patient:  Yes

## 2023-11-18 NOTE — PLAN OF CARE
Goal Outcome Evaluation:    Patient vital signs are at baseline: Yes  Patient able to ambulate as they were prior to admission or with assist devices provided by therapies during their stay:  Yes  Patient MUST void prior to discharge:  Yes, up with one assist/gb/walker  Patient able to tolerate oral intake:  Yes  Pain has adequate pain control using Oral analgesics:  Yes, Norco  Does patient have an identified :  Yes  Has goal D/C date and time been discussed with patient:  Yes            Ambulated in hallway with nursing, tolerated well. Dressing CDI. Encouraged IS. On 1-2L NC, O2 sat 87-90% on RA.

## 2023-11-18 NOTE — PLAN OF CARE
Goal Outcome Evaluation:    Orientation/Cognitive: A&OX4  Observation Goals (Met/ Not Met): Inpatient  Mobility Level/Assist Equipment: ASX1 with GB and Walker  Antibiotics & Plan (IV/po, length of tx left): N/A  Pain Management: prn Norco  Tele/VS/O2: VSS on RA  ABNL Lab/BG: Hgb 10.7  Diet: Regular  Bowel/Bladder: Continent B&B  Skin Concerns: Incision on right knee  Drains/Devices: PIV SL  Discharge : pending to TCU.

## 2023-11-19 LAB
GLUCOSE SERPL-MCNC: 111 MG/DL (ref 70–99)
HGB BLD-MCNC: 10.5 G/DL (ref 11.7–15.7)

## 2023-11-19 PROCEDURE — 250N000013 HC RX MED GY IP 250 OP 250 PS 637: Performed by: SPECIALIST/TECHNOLOGIST

## 2023-11-19 PROCEDURE — 250N000013 HC RX MED GY IP 250 OP 250 PS 637: Performed by: INTERNAL MEDICINE

## 2023-11-19 PROCEDURE — 120N000001 HC R&B MED SURG/OB

## 2023-11-19 PROCEDURE — 85018 HEMOGLOBIN: CPT | Performed by: SPECIALIST/TECHNOLOGIST

## 2023-11-19 PROCEDURE — 36415 COLL VENOUS BLD VENIPUNCTURE: CPT | Performed by: SPECIALIST/TECHNOLOGIST

## 2023-11-19 PROCEDURE — 250N000013 HC RX MED GY IP 250 OP 250 PS 637: Performed by: ORTHOPAEDIC SURGERY

## 2023-11-19 PROCEDURE — 82947 ASSAY GLUCOSE BLOOD QUANT: CPT | Performed by: INTERNAL MEDICINE

## 2023-11-19 RX ORDER — HYDROCODONE BITARTRATE AND ACETAMINOPHEN 5; 325 MG/1; MG/1
1-2 TABLET ORAL EVERY 4 HOURS PRN
Status: DISCONTINUED | OUTPATIENT
Start: 2023-11-19 | End: 2023-11-21 | Stop reason: HOSPADM

## 2023-11-19 RX ADMIN — HYDROCODONE BITARTRATE AND ACETAMINOPHEN 2 TABLET: 5; 325 TABLET ORAL at 11:14

## 2023-11-19 RX ADMIN — DOCUSATE SODIUM 50 MG AND SENNOSIDES 8.6 MG 1 TABLET: 8.6; 5 TABLET, FILM COATED ORAL at 21:13

## 2023-11-19 RX ADMIN — FLUOXETINE 40 MG: 20 CAPSULE ORAL at 08:12

## 2023-11-19 RX ADMIN — ASPIRIN 81 MG: 81 TABLET, COATED ORAL at 08:11

## 2023-11-19 RX ADMIN — ASPIRIN 81 MG: 81 TABLET, COATED ORAL at 21:12

## 2023-11-19 RX ADMIN — LEVOTHYROXINE SODIUM 100 MCG: 100 TABLET ORAL at 05:28

## 2023-11-19 RX ADMIN — ATORVASTATIN CALCIUM 40 MG: 20 TABLET, FILM COATED ORAL at 08:13

## 2023-11-19 RX ADMIN — FAMOTIDINE 20 MG: 20 TABLET ORAL at 08:10

## 2023-11-19 RX ADMIN — HYDROCODONE BITARTRATE AND ACETAMINOPHEN 1 TABLET: 5; 325 TABLET ORAL at 21:12

## 2023-11-19 RX ADMIN — DOCUSATE SODIUM 50 MG AND SENNOSIDES 8.6 MG 1 TABLET: 8.6; 5 TABLET, FILM COATED ORAL at 08:13

## 2023-11-19 RX ADMIN — HYDROCODONE BITARTRATE AND ACETAMINOPHEN 1 TABLET: 5; 325 TABLET ORAL at 05:28

## 2023-11-19 ASSESSMENT — ACTIVITIES OF DAILY LIVING (ADL)
ADLS_ACUITY_SCORE: 28
ADLS_ACUITY_SCORE: 30
ADLS_ACUITY_SCORE: 28
ADLS_ACUITY_SCORE: 28

## 2023-11-19 NOTE — PLAN OF CARE
Goal Outcome Evaluation:      Plan of Care Reviewed With: patient    Overall Patient Progress: improvingOverall Patient Progress: improving     Patient vital signs are at baseline: Yes  Patient able to ambulate as they were prior to admission or with assist devices provided by therapies during their stay:  Yes  Patient MUST void prior to discharge:  Yes  Patient able to tolerate oral intake:  Yes  Pain has adequate pain control using Oral analgesics:  Yes  Does patient have an identified :  No,  Reason:  TCU  Has goal D/C date and time been discussed with patient:  No,  Reason:  TCU    Dressing marked. Pain managed with norco. Assist of 1 with walker. Pending TCU placement.

## 2023-11-19 NOTE — PLAN OF CARE
Goal Outcome Evaluation:      Plan of Care Reviewed With: patient        Patient vital signs are at baseline: Yes  Patient able to ambulate as they were prior to admission or with assist devices provided by therapies during their stay:  Yes  Patient MUST void prior to discharge:  Yes  Patient able to tolerate oral intake:  Yes  Pain has adequate pain control using Oral analgesics:  Yes  Does patient have an identified :  No,  Reason:  Going to TCU  Has goal D/C date and time been discussed with patient:  Yes , TCU tomorrow    A&O x4, CMS intact, VSS on RA. Assist of 1 using GB and walker to toilet. Dressing CDI.    Pain managed with prn Narco. PIV SL. Discharge tomorrow to TCU.

## 2023-11-19 NOTE — PROGRESS NOTES
ORTHOPEDIC LOWER EXTREMITY PROGRESS NOTE    POD# 2  Patient is a 83 year old female who underwent Procedure(s):  RIGHT TOTAL KNEE ARTHROPLASTY on 11/17/2023. Pain is fairly controlled. Kirsten say her knee is significantly more painful than yesterday. She wants to make sure she is comfortable enough to do her exercises    Vitals: VSS-AF    Drains:  none    EXAM   The patient is awake and alert.  Calves are soft and non-tender.   Sensation is intact.  Dorsiflexion and plantar flexion is intact.  Foot warm with nl cap refill.  The incision is covered. Some ecchymosis around knee.     Labs:   Recent Labs   Lab Test 11/19/23  0800 11/18/23  1305   HGB 10.5* 10.7*     CX: none  ASSESSMENT  S/p Right TKA  Acute Blood loss anemia as expected for TKA   PLAN  1. DVT prophylaxis: aspirin  2. Weight Bearing: WBAT  3. Anticipated discharge date tomorrow 11/20 . Discharge to [facility]  4. Cont Pain Control: Norco, will adjust to 1-2 5/325 mg every 4 hours prn pain  5. Follow up with Dr. Beck in 10-14 days

## 2023-11-19 NOTE — PLAN OF CARE
Goal Outcome Evaluation:    Date/Time: 1900-7:30am    Trauma/Ortho    Diagnosis: Right Total Knee  POD#:2  Mental Status:A&Ox3 ( Hard of hearing and forgetful)  Activity/dangle: Assist x1 with GW  Diet: Regular  Pain: Norco x1, scheduled tylenol  Spencer/Voiding: BR / BSC  Tele/Restraints/Iso: NA  02/LDA:RA  D/C Date: Pending for TCU  Other Info: Passed a quiet shift, dressing CDI, IV SL, will continue to monitor.

## 2023-11-19 NOTE — PROGRESS NOTES
PAS completed.  PAS-RR    D: Per DHS regulation, SW completed and submitted PAS-RR to [place] [employer] Direct Connect via the Senior LinkAge Line.  PAS-RR confirmation # is : [id number]    I: SW spoke with patient and they are aware a PAS-RR has been submitted.  SW reviewed with patient that they may be contacted for a follow up appointment within 10 days of hospital discharge if their SNF stay is < 30 days.  Contact information for Senior LinkAge Line was also provided.    A: patient verbalized understanding.    P: Further questions may be directed to [facility] LinkAge Line at #[phone], option #4 for PAS-RR staff.  Yulissa Mchugh, TY  Inpatient   [facility]

## 2023-11-20 ENCOUNTER — APPOINTMENT (OUTPATIENT)
Dept: OCCUPATIONAL THERAPY | Facility: CLINIC | Age: 83
DRG: 470 | End: 2023-11-20
Attending: ORTHOPAEDIC SURGERY
Payer: MEDICARE

## 2023-11-20 PROCEDURE — 120N000001 HC R&B MED SURG/OB

## 2023-11-20 PROCEDURE — 97535 SELF CARE MNGMENT TRAINING: CPT | Mod: GO

## 2023-11-20 PROCEDURE — 250N000013 HC RX MED GY IP 250 OP 250 PS 637: Performed by: ORTHOPAEDIC SURGERY

## 2023-11-20 PROCEDURE — 250N000013 HC RX MED GY IP 250 OP 250 PS 637: Performed by: INTERNAL MEDICINE

## 2023-11-20 PROCEDURE — 250N000013 HC RX MED GY IP 250 OP 250 PS 637: Performed by: SPECIALIST/TECHNOLOGIST

## 2023-11-20 RX ORDER — HYDROCODONE BITARTRATE AND ACETAMINOPHEN 5; 325 MG/1; MG/1
1-2 TABLET ORAL EVERY 4 HOURS PRN
Qty: 30 TABLET | Refills: 0 | Status: SHIPPED | OUTPATIENT
Start: 2023-11-20 | End: 2023-11-28

## 2023-11-20 RX ADMIN — LEVOTHYROXINE SODIUM 100 MCG: 100 TABLET ORAL at 05:47

## 2023-11-20 RX ADMIN — FLUOXETINE 40 MG: 20 CAPSULE ORAL at 08:43

## 2023-11-20 RX ADMIN — FAMOTIDINE 20 MG: 20 TABLET ORAL at 08:43

## 2023-11-20 RX ADMIN — POLYETHYLENE GLYCOL 3350 17 G: 17 POWDER, FOR SOLUTION ORAL at 08:43

## 2023-11-20 RX ADMIN — ASPIRIN 81 MG: 81 TABLET, COATED ORAL at 20:40

## 2023-11-20 RX ADMIN — ASPIRIN 81 MG: 81 TABLET, COATED ORAL at 08:43

## 2023-11-20 RX ADMIN — DOCUSATE SODIUM 50 MG AND SENNOSIDES 8.6 MG 1 TABLET: 8.6; 5 TABLET, FILM COATED ORAL at 08:43

## 2023-11-20 RX ADMIN — HYDROCODONE BITARTRATE AND ACETAMINOPHEN 1 TABLET: 5; 325 TABLET ORAL at 08:48

## 2023-11-20 RX ADMIN — HYDROCODONE BITARTRATE AND ACETAMINOPHEN 1 TABLET: 5; 325 TABLET ORAL at 20:40

## 2023-11-20 RX ADMIN — ATORVASTATIN CALCIUM 40 MG: 20 TABLET, FILM COATED ORAL at 08:43

## 2023-11-20 ASSESSMENT — ACTIVITIES OF DAILY LIVING (ADL)
ADLS_ACUITY_SCORE: 33
ADLS_ACUITY_SCORE: 30
ADLS_ACUITY_SCORE: 33
ADLS_ACUITY_SCORE: 33
ADLS_ACUITY_SCORE: 30
ADLS_ACUITY_SCORE: 33
ADLS_ACUITY_SCORE: 30
ADLS_ACUITY_SCORE: 33
ADLS_ACUITY_SCORE: 30
ADLS_ACUITY_SCORE: 33

## 2023-11-20 NOTE — PROGRESS NOTES
ORTHOPEDIC LOWER EXTREMITY PROGRESS NOTE    POD# 3  Patient is a 83 year old female who underwent Procedure(s):  RIGHT TOTAL KNEE ARTHROPLASTY on 11/17/2023. Pain is fairly controlled. Kirsten says morning are harder for her and she feels weak when ambulating.  We reviewed discharge instructions.  Awaiting SNF placement    Vitals: VSS-AF    Drains:  none    EXAM   The patient is awake and alert.  Calves are soft and non-tender.   Sensation is intact.  Dorsiflexion and plantar flexion is intact.  Foot warm with nl cap refill.  The incision is covered. AG intact. Some ecchymosis around knee.     Labs:   Recent Labs   Lab Test 11/19/23  0800 11/18/23  1305   HGB 10.5* 10.7*     CX: none  ASSESSMENT  S/p Right TKA  Acute Blood loss anemia as expected for TKA   PLAN  1. DVT prophylaxis: aspirin  2. Weight Bearing: WBAT  3. Anticipated discharge date today pending bed availability. Discharge to [facility]  4. Cont Pain Control: Norco printed and signed for TCU  5. Follow up with Dr. Beck in 10-14 days      Robyn Escamilla PA-C  [facility]

## 2023-11-20 NOTE — PROGRESS NOTES
Care Management Follow Up    Length of Stay (days): 2    Expected Discharge Date: 11/20/2023     Concerns to be Addressed:       Patient plan of care discussed at interdisciplinary rounds: Yes    Anticipated Discharge Disposition: Transitional Care     Anticipated Discharge Services: None  Anticipated Discharge DME: None    Patient/family educated on Medicare website which has current facility and service quality ratings: no (had prepared plan)  Education Provided on the Discharge Plan:    Patient/Family in Agreement with the Plan: yes    Referrals Placed by CM/SW: Post Acute Facilities  Private pay costs discussed: Not applicable    Additional Information:  Call placed to [facility] and referral resent. Admissions did confirm that patient had called ahead. Facility will update when referral reviewed and to discuss discharge plan.    1430: patient accepted to [facility] tomorrow. Writer spoke to patient who would like SW to call her friend Erica [phone]. Call placed and discussed timing tomorrow as room is available anytime. Erica will pick patient up at 1030 at door 6 and transport. She asked that Kirsten call her when they are going leaving the room and she will be waiting. This was relayed to Kirsten who is in agreement. Facility updated on time.    TY Roblero

## 2023-11-20 NOTE — PLAN OF CARE
Goal Outcome Evaluation:      Plan of Care Reviewed With: patient    Overall Patient Progress: improvingOverall Patient Progress: improving     Date/Time:0795-8921     Trauma/Ortho     Diagnosis: Right TKA  POD#: 3  Mental Status: A&Ox4  Activity/dangle: Assist x1 with gait belt and four wheeled walker  Diet: Regular  Pain: Norco x 1, ice  Spencer/Voiding: BR  Tele/Restraints/Iso: NA  02/LDA:RA  D/C Date: tomorrow to [facility].  Other Info: PIV SL, dressing scant dried drng intact, Pain managed with ice and norco x 1.

## 2023-11-20 NOTE — PLAN OF CARE
Goal Outcome Evaluation:    Date/Time:1900-7:30 am    Trauma/Ortho    Diagnosis: Right TKA  POD#: 3  Mental Status: A&Ox4  Activity/dangle: Assist x1 with GW  Diet: Regular  Pain: Norco x 1  Spencer/Voiding: BR  Tele/Restraints/Iso: NA  02/LDA:RA  D/C Date: Pending for today  Other Info: PIV SL, dressing CDI, will continue to monitor.

## 2023-11-21 VITALS
DIASTOLIC BLOOD PRESSURE: 66 MMHG | SYSTOLIC BLOOD PRESSURE: 146 MMHG | WEIGHT: 175.1 LBS | OXYGEN SATURATION: 93 % | BODY MASS INDEX: 29.89 KG/M2 | TEMPERATURE: 97.9 F | RESPIRATION RATE: 16 BRPM | HEART RATE: 81 BPM | HEIGHT: 64 IN

## 2023-11-21 LAB
ANION GAP SERPL CALCULATED.3IONS-SCNC: 9 MMOL/L (ref 7–15)
BUN SERPL-MCNC: 15.1 MG/DL (ref 8–23)
CALCIUM SERPL-MCNC: 9.3 MG/DL (ref 8.8–10.2)
CHLORIDE SERPL-SCNC: 105 MMOL/L (ref 98–107)
CREAT SERPL-MCNC: 0.82 MG/DL (ref 0.51–0.95)
DEPRECATED HCO3 PLAS-SCNC: 26 MMOL/L (ref 22–29)
EGFRCR SERPLBLD CKD-EPI 2021: 71 ML/MIN/1.73M2
GLUCOSE SERPL-MCNC: 160 MG/DL (ref 70–99)
POTASSIUM SERPL-SCNC: 4.1 MMOL/L (ref 3.4–5.3)
SODIUM SERPL-SCNC: 140 MMOL/L (ref 135–145)

## 2023-11-21 PROCEDURE — 250N000013 HC RX MED GY IP 250 OP 250 PS 637: Performed by: ORTHOPAEDIC SURGERY

## 2023-11-21 PROCEDURE — 80048 BASIC METABOLIC PNL TOTAL CA: CPT | Performed by: ORTHOPAEDIC SURGERY

## 2023-11-21 PROCEDURE — 36415 COLL VENOUS BLD VENIPUNCTURE: CPT | Performed by: ORTHOPAEDIC SURGERY

## 2023-11-21 PROCEDURE — 250N000013 HC RX MED GY IP 250 OP 250 PS 637: Performed by: SPECIALIST/TECHNOLOGIST

## 2023-11-21 PROCEDURE — 250N000013 HC RX MED GY IP 250 OP 250 PS 637: Performed by: INTERNAL MEDICINE

## 2023-11-21 RX ADMIN — HYDROCODONE BITARTRATE AND ACETAMINOPHEN 1 TABLET: 5; 325 TABLET ORAL at 07:45

## 2023-11-21 RX ADMIN — ATORVASTATIN CALCIUM 40 MG: 20 TABLET, FILM COATED ORAL at 08:59

## 2023-11-21 RX ADMIN — POLYETHYLENE GLYCOL 3350 17 G: 17 POWDER, FOR SOLUTION ORAL at 08:59

## 2023-11-21 RX ADMIN — DOCUSATE SODIUM 50 MG AND SENNOSIDES 8.6 MG 1 TABLET: 8.6; 5 TABLET, FILM COATED ORAL at 08:59

## 2023-11-21 RX ADMIN — HYDROCODONE BITARTRATE AND ACETAMINOPHEN 1 TABLET: 5; 325 TABLET ORAL at 01:16

## 2023-11-21 RX ADMIN — FAMOTIDINE 20 MG: 20 TABLET ORAL at 08:59

## 2023-11-21 RX ADMIN — LEVOTHYROXINE SODIUM 100 MCG: 100 TABLET ORAL at 06:13

## 2023-11-21 RX ADMIN — FLUOXETINE 40 MG: 20 CAPSULE ORAL at 08:59

## 2023-11-21 RX ADMIN — ASPIRIN 81 MG: 81 TABLET, COATED ORAL at 08:59

## 2023-11-21 ASSESSMENT — ACTIVITIES OF DAILY LIVING (ADL)
ADLS_ACUITY_SCORE: 32
ADLS_ACUITY_SCORE: 33
ADLS_ACUITY_SCORE: 32

## 2023-11-21 NOTE — PLAN OF CARE
Occupational Therapy Discharge Summary    Reason for therapy discharge:    Discharged to transitional care facility.    Progress towards therapy goal(s). See goals on Care Plan in [facility] electronic health record for goal details.  Goals partially met.  Barriers to achieving goals:   discharge from facility.    Therapy recommendation(s):    Continued therapy is recommended.  Rationale/Recommendations:  Pt is below baseline s/p RTKA. Pt has had multiple falls recently and lives alone, and was previously Ind at baseline. Pt currently requiring CGA and VC for balance and walker safety in all ADLs. Recommend TCU to progress safety and independence for I/ADLs.

## 2023-11-21 NOTE — PROGRESS NOTES
ORTHOPEDIC LOWER EXTREMITY PROGRESS NOTE    POD# 4  Patient is a 83 year old female who underwent Procedure(s):  RIGHT TOTAL KNEE ARTHROPLASTY on 11/17/2023. Pain is controlled. Discharging to [facility] this morning.  We reviewed discharge instructions.      Vitals: VSS-AF    Drains:  none    EXAM   The patient is awake and alert.  Calves are soft and non-tender.   Sensation is intact.  Dorsiflexion and plantar flexion is intact.  Foot warm with nl cap refill.  The incision is covered. AG intact. Some ecchymosis around knee.     Labs:   Recent Labs   Lab Test 11/19/23  0800 11/18/23  1305   HGB 10.5* 10.7*     CX: none  ASSESSMENT  S/p Right TKA  Acute Blood loss anemia as expected for TKA   PLAN  1. DVT prophylaxis: aspirin  2. Weight Bearing: WBAT  3. Anticipated discharge date today. Discharge to [facility]  4. Cont Pain Control: Norco printed and signed for TCU  5. Follow up with Dr. Beck in 10-14 days      Robyn Escamilla PA-C  [facility]

## 2023-11-21 NOTE — PROGRESS NOTES
Pt dressed. R knee dressing CDI. Pain managed with prn Norco x1. PIV removed. Discharge package given to pt. Pt discharged with discharge package and belongings.

## 2023-11-21 NOTE — PROGRESS NOTES
Care Management Discharge Note    Discharge Date: 11/21/2023       Discharge Disposition: Transitional Care    Discharge Services: None    Discharge DME: None    Discharge Transportation: family or friend will provide    Private pay costs discussed: Not applicable    Does the patient's insurance plan have a 3 day qualifying hospital stay waiver?  No    PAS Confirmation Code: [id number]  Patient/family educated on Medicare website which has current facility and service quality ratings: no (had prepared plan)    Education Provided on the Discharge Plan:    Persons Notified of Discharge Plans: HUC, Charge Nurse, Nurse, MD, Family, Patient, Facility.  Patient/Family in Agreement with the Plan: yes    Handoff Referral Completed: Yes    Additional Information:  Patient will be discharging to TCU at [facility]. Patient will be transported by friend at 10:30 and will pick patient up at door 6. PAS has been completed. Scripts have been faxed to the facility. Orders have been sent via [employer]. Patient will be discharging today to TCU.    Antonio Smith [facility]  Care Management

## 2023-11-21 NOTE — PLAN OF CARE
Problem: Pain Acute  Goal: Optimal Pain Control and Function  Outcome: Progressing  Intervention: Develop Pain Management Plan  Recent Flowsheet Documentation  Taken 11/21/2023 0116 by Alesha Deras RN  Pain Management Interventions: medication (see MAR)  Intervention: Prevent or Manage Pain  Recent Flowsheet Documentation  Taken 11/21/2023 0100 by Alesha Deras RN  Medication Review/Management: medications reviewed   Goal Outcome Evaluation:         Date/Time 11/20/23 @ 0971-8590    Trauma/Ortho/Medical (Choose one) Ortho    Diagnosis: R total knee arthroplasty  POD#: 4   Mental Status: Alert and oriented x 4  Activity/dangle Assist of 1 with GB and walker  Diet: Regular  Pain: Managed with PRN Norco  Spencer/Voiding: Bathroom  Tele/Restraints/Iso: N/A  02/LDA: on room air, PIV SL on the L lower forearm  D/C Date: Discharging today to [facility]  Other Info: CMS intact, dressing witch scant dried drainage. Friend will pick her up at 1030 at door 6.

## 2023-11-21 NOTE — PLAN OF CARE
Goal Outcome Evaluation:       Pt A&Ox4. VSS on RA. Regular diet. Up 1A GB&W. Voiding in BR. R knee dressing intact w/ scant dried drainage. CMS intact. Pain controlled w/ PRN Latexo. L PIV SL. [facility] tomorrow 11/21 w/ friend pickup around 1030. Continue plan of care.

## 2023-11-28 ENCOUNTER — DISCHARGE SUMMARY NURSING HOME (OUTPATIENT)
Dept: GERIATRICS | Facility: CLINIC | Age: 83
End: 2023-11-28
Payer: MEDICARE

## 2023-11-28 VITALS
TEMPERATURE: 97.9 F | DIASTOLIC BLOOD PRESSURE: 66 MMHG | HEIGHT: 64 IN | WEIGHT: 175 LBS | OXYGEN SATURATION: 93 % | SYSTOLIC BLOOD PRESSURE: 146 MMHG | BODY MASS INDEX: 29.88 KG/M2 | RESPIRATION RATE: 16 BRPM | HEART RATE: 81 BPM

## 2023-11-28 DIAGNOSIS — Z96.651 S/P TOTAL KNEE ARTHROPLASTY, RIGHT: ICD-10-CM

## 2023-11-28 DIAGNOSIS — R52 PAIN: ICD-10-CM

## 2023-11-28 DIAGNOSIS — N39.41 URGE INCONTINENCE OF URINE: ICD-10-CM

## 2023-11-28 DIAGNOSIS — K59.03 DRUG-INDUCED CONSTIPATION: ICD-10-CM

## 2023-11-28 DIAGNOSIS — F32.4 MAJOR DEPRESSIVE DISORDER IN PARTIAL REMISSION, UNSPECIFIED WHETHER RECURRENT (H): ICD-10-CM

## 2023-11-28 DIAGNOSIS — Z96.651 S/P TOTAL KNEE ARTHROPLASTY, RIGHT: Primary | ICD-10-CM

## 2023-11-28 DIAGNOSIS — R60.9 EDEMA, UNSPECIFIED TYPE: ICD-10-CM

## 2023-11-28 DIAGNOSIS — Z79.899 ON DEEP VEIN THROMBOSIS (DVT) PROPHYLAXIS: ICD-10-CM

## 2023-11-28 PROCEDURE — 99316 NF DSCHRG MGMT 30 MIN+: CPT

## 2023-11-28 RX ORDER — HYDROCODONE BITARTRATE AND ACETAMINOPHEN 5; 325 MG/1; MG/1
1 TABLET ORAL EVERY 4 HOURS PRN
Qty: 15 TABLET | Refills: 0 | Status: SHIPPED | OUTPATIENT
Start: 2023-11-28

## 2023-11-28 NOTE — LETTER
"    11/28/2023        RE: Kirsten Cortés  [address]  [place] [facility] [place] [facility] [address]        [facility] GERIATRICS DISCHARGE SUMMARY  PATIENT'S NAME: Kirsten Cortés  YOB: 1940  MEDICAL RECORD NUMBER:  [id number]  Place of Service where encounter took place:  [facility] (SNF) [[id number]]    PRIMARY CARE PROVIDER AND CLINIC RESPONSIBLE AFTER TRANSFER:   Claire Hearn, HERSELF [address] [address]    [facility] Provider     Transferring providers: Jasvir Nicholas NP; Benjamin Rosenstein, MD  Recent Hospitalization/ED:  [facility] stay 11/17/23 to 11/21/23.  Date of SNF Admission: November 23, 2023  Date of SNF (anticipated) Discharge: November 28, 2023  Discharged to: previous independent home  Cognitive Scores: BIMS: 15/15  DME: No DME needed    CODE STATUS/ADVANCE DIRECTIVES DISCUSSION:  Full code  ALLERGIES: Oxycodone-acetaminophen    NURSING FACILITY COURSE   Medication Changes/Rationale:   N/A    Summary of nursing facility stay:   Kirsten Cortés is a 83 year old female with PMH of hyperlipidemia, hypothyroidism, vitamin B12 deficiency, and anxiety. She underwent elective right TKA on 11/17/2023. No postoperative complications were noted. She was discharged with [place] to this facility for rehabilitation and fall prevention.     Patient is seen today in her room accompanied by her counselor. She feels \"normal\". She endorses pain at the surgical site but tolerable; has been able to participate in therapies. She notes pain relief with Norco. Appetite has been good. No sleeping concerns but mentioned having RLS last night. Patient reports urinary incontinence, specifically \"urge\" and \"dribbling\". Denies any other urinary symptoms. Will follow-up with PCP. Denies worsening depression/mood, CP, palpitations, lightheadedness, dizziness, fatigue, SOB, fever, chills, nausea/vomiting, or bowel concerns " "today.    Assessment/Plan:  S/P total knee arthroplasty, right  Pain  Patient endorses pain today. Continues with Norco for pain relief. Tylenol PRN minimally used.   -recommend scheduling Tylenol   -taper and discontinue opioid    Edema  On deep vein thrombosis (DVT) prophylaxis  Right leg edema noted today. Patient reports forgetting to wear compression stockings this morning. No calf pain. Capillary refill <2 secs.   -continue compression stockings daily  -encourage leg elevation  -continue aspirin 81 mg BID as ordered    Drug-induced constipation  Patient denies constipation.  -continue Senna-S 1 tab BID     Major depressive disorder in partial remission, unspecified whether recurrent (H24)  Patient denies worsening depression. No suicide ideation.  -continue fluoxetine 40 mg daily    Urge incontinence of urine  Patient reports \"urge\" incontinence and \"dribbling\" which started after surgery. No signs/symptoms of infection. VSS. Writer advised patient to follow-up with PCP since patient is discharging. Patient receptive to plan.       Discharge Medications:  MED REC REQUIRED  Post Medication Reconciliation Status:          Current Outpatient Medications   Medication Sig Dispense Refill     acetaminophen (TYLENOL) 325 MG tablet Take 2 tablets (650 mg) by mouth every 4 hours as needed for other (mild pain) 100 tablet 0     aspirin 81 MG EC tablet Take 1 tablet (81 mg) by mouth 2 times daily 90 tablet 0     atorvastatin (LIPITOR) 40 MG tablet Take 40 mg by mouth daily for cholesterol       CHOLECALCIFEROL, VITAMIN D3, (VITAMIN D3 ORAL) [CHOLECALCIFEROL, VITAMIN D3, (VITAMIN D3 ORAL)] Take 2,000 Units by mouth daily.       cyanocobalamin (CYANOCOBALAMIN) 1000 MCG/ML injection Inject 1 mL into the muscle every 30 days       FLUoxetine (PROZAC) 40 MG capsule Take 40 mg by mouth daily       HYDROcodone-acetaminophen (NORCO) 5-325 MG tablet Take 1 tablet by mouth every 4 hours as needed for moderate pain 1 if pain rated " "1-5 & 2 if 6-10. 15 tablet 0     levothyroxine (SYNTHROID/LEVOTHROID) 100 MCG tablet Take 100 mcg by mouth daily       polyethylene glycol (MIRALAX) 17 g packet Take 17 g by mouth daily 7 packet 0     PSYLLIUM SEED, WITH SUGAR, (METAMUCIL ORAL) Take 1 Tablespoonful by mouth daily as needed       senna-docusate (SENOKOT-S/PERICOLACE) 8.6-50 MG tablet Take 1-2 tablets by mouth 2 times daily Take while on oral narcotics to prevent or treat constipation. 30 tablet 0        MED REC REQUIRED  Post Medication Reconciliation Status:        Controlled medications:   Norco. Okay to discharge with 15 tabs.      Past Medical History:   Past Medical History:   Diagnosis Date     Anxiety      Arthritis      Back pain      Cognitive impairment      Depression      Falls frequently      History of kidney stones 2015     Hypothyroidism      Renal cell carcinoma (H)      Physical Exam:   Vitals: BP (!) 146/66   Pulse 81   Temp 97.9  F (36.6  C)   Resp 16   Ht 1.626 m (5' 4\")   Wt 79.4 kg (175 lb)   SpO2 93%   BMI 30.04 kg/m    BMI: Body mass index is 30.04 kg/m .  GENERAL APPEARANCE:  Alert, in no distress, sitting in recliner  HEENT:  atraumatic, EOM intact, dentulous/partial dentition/wears hearing aids, moist mucus membranes  RESP:  non-labored breathing, lungs clear to auscultation, no respiratory distress, no cough  CV:  Rate regular, S1 S2 noted, mild pitting edema noted on right foot, capillary refill <2secs  ABDOMEN:  soft, large, non-tender, bowel sounds normal  M/S:   uses walker for ambulation, moves all extremities, strength and tone equal bilaterally, no calf pain  SKIN:  right knee incision covered in dressing, old drainage on bandage noted, surrounding skin non-erythematous  NEURO:  examination of sensation by touch normal, follows and tracks, slow speech  PSYCH:  calm, cooperative    SNF labs: Labs reviewed as per Epic and/or Care Everywhere.      DISCHARGE PLAN:  Follow up labs: No labs orders/due  Medical " Follow Up:      Follow up with primary care provider in 1 week   Follow up with Ortho as directed    Discharge Services: Home Care:  Occupational Therapy, Physical Therapy, Registered Nurse, and Home Health Aide    TOTAL DISCHARGE TIME:   Greater than 30 minutes  Electronically signed by:  Dr. Holly Poe, DNP, APRN, AGNP-BC                 Sincerely,        HOLLY POE, [place] CNP

## 2023-11-28 NOTE — PROGRESS NOTES
"[facility] GERIATRICS DISCHARGE SUMMARY  PATIENT'S NAME: Kirsten Cortés  YOB: 1940  MEDICAL RECORD NUMBER:  [id number]  Place of Service where encounter took place:  [facility] ([facility]) [[id number]]    PRIMARY CARE PROVIDER AND CLINIC RESPONSIBLE AFTER TRANSFER:   Claire Hearn, HERSELF [address] [address]    [facility] Provider     Transferring providers: Jasvir Nicholas NP; Benjamin Rosenstein, MD  Recent Hospitalization/ED:  [facility] Hospital stay 11/17/23 to 11/21/23.  Date of SNF Admission: November 23, 2023  Date of SNF (anticipated) Discharge: November 28, 2023  Discharged to: previous independent home  Cognitive Scores: BIMS: 15/15  DME: No DME needed    CODE STATUS/ADVANCE DIRECTIVES DISCUSSION:  Full code  ALLERGIES: Oxycodone-acetaminophen    NURSING FACILITY COURSE   Medication Changes/Rationale:   N/A    Summary of nursing facility stay:   Kirsten Cortés is a 83 year old female with PMH of hyperlipidemia, hypothyroidism, vitamin B12 deficiency, and anxiety. She underwent elective right TKA on 11/17/2023. No postoperative complications were noted. She was discharged with [place] to this facility for rehabilitation and fall prevention.     Patient is seen today in her room accompanied by her counselor. She feels \"normal\". She endorses pain at the surgical site but tolerable; has been able to participate in therapies. She notes pain relief with Norco. Appetite has been good. No sleeping concerns but mentioned having RLS last night. Patient reports urinary incontinence, specifically \"urge\" and \"dribbling\". Denies any other urinary symptoms. Will follow-up with PCP. Denies worsening depression/mood, CP, palpitations, lightheadedness, dizziness, fatigue, SOB, fever, chills, nausea/vomiting, or bowel concerns today.    Assessment/Plan:  S/P total knee arthroplasty, right  Pain  Patient endorses pain today. Continues with [place] for pain " "relief. Tylenol PRN minimally used.   -recommend scheduling Tylenol   -taper and discontinue opioid    Edema  On deep vein thrombosis (DVT) prophylaxis  Right leg edema noted today. Patient reports forgetting to wear compression stockings this morning. No calf pain. Capillary refill <2 secs.   -continue compression stockings daily  -encourage leg elevation  -continue aspirin 81 mg BID as ordered    Drug-induced constipation  Patient denies constipation.  -continue Senna-S 1 tab BID     Major depressive disorder in partial remission, unspecified whether recurrent (H24)  Patient denies worsening depression. No suicide ideation.  -continue fluoxetine 40 mg daily    Urge incontinence of urine  Patient reports \"urge\" incontinence and \"dribbling\" which started after surgery. No signs/symptoms of infection. VSS. Writer advised patient to follow-up with PCP since patient is discharging. Patient receptive to plan.       Discharge Medications:  MED REC REQUIRED  Post Medication Reconciliation Status:          Current Outpatient Medications   Medication Sig Dispense Refill    acetaminophen (TYLENOL) 325 MG tablet Take 2 tablets (650 mg) by mouth every 4 hours as needed for other (mild pain) 100 tablet 0    aspirin 81 MG EC tablet Take 1 tablet (81 mg) by mouth 2 times daily 90 tablet 0    atorvastatin (LIPITOR) 40 MG tablet Take 40 mg by mouth daily for cholesterol      CHOLECALCIFEROL, VITAMIN D3, (VITAMIN D3 ORAL) [CHOLECALCIFEROL, VITAMIN D3, (VITAMIN D3 ORAL)] Take 2,000 Units by mouth daily.      cyanocobalamin (CYANOCOBALAMIN) 1000 MCG/ML injection Inject 1 mL into the muscle every 30 days      FLUoxetine (PROZAC) 40 MG capsule Take 40 mg by mouth daily      HYDROcodone-acetaminophen (NORCO) 5-325 MG tablet Take 1 tablet by mouth every 4 hours as needed for moderate pain 1 if pain rated 1-5 & 2 if 6-10. 15 tablet 0    levothyroxine (SYNTHROID/LEVOTHROID) 100 MCG tablet Take 100 mcg by mouth daily      polyethylene glycol " "(MIRALAX) 17 g packet Take 17 g by mouth daily 7 packet 0    PSYLLIUM SEED, WITH SUGAR, (METAMUCIL ORAL) Take 1 Tablespoonful by mouth daily as needed      senna-docusate (SENOKOT-S/PERICOLACE) 8.6-50 MG tablet Take 1-2 tablets by mouth 2 times daily Take while on oral narcotics to prevent or treat constipation. 30 tablet 0        MED REC REQUIRED  Post Medication Reconciliation Status:        Controlled medications:   Norco. Okay to discharge with 15 tabs.      Past Medical History:   Past Medical History:   Diagnosis Date    Anxiety     Arthritis     Back pain     Cognitive impairment     Depression     Falls frequently     History of kidney stones 2015    Hypothyroidism     Renal cell carcinoma (H)      Physical Exam:   Vitals: BP (!) 146/66   Pulse 81   Temp 97.9  F (36.6  C)   Resp 16   Ht 1.626 m (5' 4\")   Wt 79.4 kg (175 lb)   SpO2 93%   BMI 30.04 kg/m    BMI: Body mass index is 30.04 kg/m .  GENERAL APPEARANCE:  Alert, in no distress, sitting in recliner  HEENT:  atraumatic, EOM intact, dentulous/partial dentition/wears hearing aids, moist mucus membranes  RESP:  non-labored breathing, lungs clear to auscultation, no respiratory distress, no cough  CV:  Rate regular, S1 S2 noted, mild pitting edema noted on right foot, capillary refill <2secs  ABDOMEN:  soft, large, non-tender, bowel sounds normal  M/S:   uses walker for ambulation, moves all extremities, strength and tone equal bilaterally, no calf pain  SKIN:  right knee incision covered in dressing, old drainage on bandage noted, surrounding skin non-erythematous  NEURO:  examination of sensation by touch normal, follows and tracks, slow speech  PSYCH:  calm, cooperative    SNF labs: Labs reviewed as per [facility] and/or Care Everywhere.      DISCHARGE PLAN:  Follow up labs: No labs orders/due  Medical Follow Up:      Follow up with primary care provider in 1 week   Follow up with Ortho as directed    Discharge Services: Home Care:  Occupational " Therapy, Physical Therapy, Registered Nurse, and Home Health Aide    TOTAL DISCHARGE TIME:   Greater than 30 minutes  Electronically signed by:  Dr. Joyce Poe, DNP, APRN, AGNP-BC

## 2023-11-29 PROBLEM — F32.4 MAJOR DEPRESSIVE DISORDER IN PARTIAL REMISSION, UNSPECIFIED WHETHER RECURRENT (H): Status: ACTIVE | Noted: 2023-11-29

## 2023-11-29 NOTE — DISCHARGE SUMMARY
HOSPITAL DISCHARGE SUMMARY    Patient Name: Kirsten Cortés  YOB: 1940  Age: 83 year old  Medical Record Number: [id number]  Primary Physician: Claire Hearn  Phone: [phone]  Admission Date: 11/17/2023  Discharge Date: 11/21/2023     She will be discharged from [facility] to Skilled Nursing Facility, [facility]    PRINCIPAL DISCHARGE DIAGNOSIS: right kneeDJD  Patient Active Problem List   Diagnosis    Renal mass, left    Osteoarthritis of right knee, unspecified osteoarthritis type    S/P total knee arthroplasty, right    Major depressive disorder in partial remission, unspecified whether recurrent (H24)        PROCEDURES PERFORMED DURING HOSPITALIZATION: Total knee arthroplasty right side    BRIEF HOSPITAL COURSE: This is a pleasant 83 year old female who has had persistent complaints of pain that has failed non-operative management. Preoperative x-rays revealed arthritic changes in the right knee.  The risks, benefits and possible complications of the above procedure were discussed with the patient. Patient elected to proceed to improve their lifestyle. Informed consent was obtained. For further details, please refer to the H&P in the chart.    The patient was admitted on 11/17/2023 and underwent the above-mentioned procedure. Patient tolerated this procedure well. Postoperatively, patient was seen in consultation by the internal medicine hospitalist service. Throughout the hospitalization, wound remained clean. The patient was started and advanced in a diet. CMS remained intact. Patient was seen and evaluated by physical therapy and rehab program was initiated. Patient was also seen by occupational therapy. Hemoglobin on day of discharge was stable.    COMPLICATIONS IN HOSPITAL: None    PERTINENT FINDINGS/RESULTS AT DISCHARGE:   Blood pressure (!) 146/66, pulse 81, temperature 97.9  F (36.6  C), temperature source Oral, resp. rate 16, height 1.626 m (5'  "4\"), weight 79.4 kg (175 lb 1.6 oz), SpO2 93%.      Subjective: Patient feels good today.  Pain controlled. Discharge instructions reviewed.      PE:   The patient is awake and alert  Calves are soft and non-tender.   Sensation is intact.  Dorsiflexion and plantar flexion is intact.  Foot warm with nl cap refill.  The incision is incision: covered.      Latest Laboratory Results:  Chem:  Recent Labs   Lab Test 11/21/23  0842   POTASSIUM 4.1     WBC/Hgb:  Recent Labs   Lab Test 11/19/23  0800 11/18/23  1305   HGB 10.5* 10.7*     INR:  No results for input(s): \"INR\" in the last 15809 hours.  No components found for: \"[id number]\"    IMPORTANT PENDING TEST RESULTS: None    CONDITION AT DISCHARGE:    Stabilized    DISCHARGE ORDERS    Discharge Orders        Reason for your hospital stay    Right total knee arthroplasty     When to call - Contact Surgeon Team    You may experience symptoms that require follow-up before your scheduled appointment. Refer to the \"Stoplight Tool\" for instructions on when to contact your Surgeon Team if you are concerned about pain control, blood clots, constipation, or if you are unable to urinate.     When to call - Reach out to Urgent Care    If you are not able to reach your Surgeon Team and you need immediate care, go to the Orthopedic Walk-in Clinic or Urgent Care at your Surgeon's office.  Do NOT go to the Emergency Room unless you have shortness of breath, chest pain, or other signs of a medical emergency.     When to call - Reasons to Call 911    Call 911 immediately if you experience sudden-onset chest pain, arm weakness/numbness, slurred speech, or shortness of breath     Discharge Instruction - Breathing exercises    Perform breathing exercises using your Incentive Spirometer 10 times per hour while awake for 2 weeks.     Symptoms - Fever Management    A low grade fever can be expected after surgery.  Use acetaminophen (TYLENOL) as needed for fever management.  Contact your " Surgeon Team if you have a fever greater than 101.5 F, chills, and/or night sweats.     Symptoms - Constipation management    Constipation (hard, dry bowel movements) is expected after surgery due to the combination of being less active, the anesthetic, and the opioid pain medication.  You can do the following to help reduce constipation:  ~  FLUIDS:  Drink clear liquids (water or Gatorade), or juice (apple/prune).  ~  DIET:  Eat a fiber rich diet.    ~  ACTIVITY:  Get up and move around several times a day.  Increase your activity as you are able.  MEDICATIONS:  Reduce the risk of constipation by starting medications before you are constipated.  You can take Miralax   (1 packet as directed) and/or a stool softener (Senokot 1-2 tablets 1-2 times a day).  If you already have constipation and these medications are not working, you can get magnesium citrate and use as directed.  If you continue to have constipation you can try an over the counter suppository or enema.  Call your Surgeon Team if it has been greater than 3 days since your last bowel movement.     Symptoms - Reduced Urine Output    Changes in the amount of fluids you drank before and after surgery may result in problems urinating.  It is important to stay well-hydrated after surgery and drink plenty of water. If it has been greater than 8 hours since you have urinated despite drinking plenty of water, call your Surgeon Team.     Activity - Exercises to prevent blood clots    Unless otherwise directed by your Surgeon team, perform the following exercises at least three times per day for the first four weeks after surgery to prevent blood clots in your legs: 1) Point and flex your feet (Ankle Pumps), 2) Move your ankle around in big circles, 3) Wiggle your toes, 4) Walk, even for short distances, several times a day, will help decrease the risk of blood clots.     Comfort and Pain Management - Pain after Surgery    Pain after surgery is normal and expected.  " You will have some amount of pain for several weeks after surgery.  Your pain will improve with time.  There are several things you can do to help reduce your pain including: rest, ice, elevation, and using pain medications as needed. Contact your Surgeon Team if you have pain that persists or worsens after surgery despite rest, ice, elevation, and taking your medication(s) as prescribed. Contact your Surgeon Team if you have new numbness, tingling, or weakness in your operative extremity.     Comfort and Pain Management - Swelling after Surgery    Swelling and/or bruising of the surgical extremity is common and may persist for several months after surgery. In addition to frequent icing and elevation, gentle compressive support with an ACE wrap or tubigrip may help with swelling. Apply compression regularly, removing at least twice daily to perform skin checks. Contact your Surgeon Team if your swelling increases and is NOT associated with an increase in your activity level, or if your swelling increases and is associated with redness and pain.     Comfort and Pain Management - LOWER Extremity Elevation    Swelling is expected for several months after surgery. This type of swelling is usually associated with gravity and activity, and can be improved with elevation.   The best way to do this is to get your \"toes above your nose\" by laying down and placing several pillows lengthwise under your calf and heel. When elevating your leg keep your knee completely straight. Perform this elevation as often as possible especially for the first two weeks after surgery.     Comfort and Pain Management - Cold therapy    Ice can be used to control swelling and discomfort after surgery. Place a thin towel over your operative site and apply the ice pack overtop. Leave ice pack in place for 20 minutes, then remove for 20 minutes. Repeat this 20 minutes on/20 minutes off routine as often as tolerated.     Medication Instructions - " Acetaminophen (TYLENOL) Instructions    You were discharged with acetaminophen (TYLENOL) for pain management after surgery. Acetaminophen most effectively manages pain symptoms when it is taken on a schedule without missing doses (every four, six, or eight hours). Your Provider will prescribe a safe daily dose between 3000 - 4000 mg.  Do NOT exceed this daily dose. Most patients use acetaminophen for pain control for the first four weeks after surgery.  You can wean from this medication as your pain decreases.     Medication Instructions - NSAID Instructions    You were discharged with an anti-inflammatory medication for pain management to use in combination with acetaminophen (TYLENOL) and the narcotic pain medication.  Take this medication exactly as directed.  You should only take one anti-inflammatory at a time.  Some common anti-inflammatories include: ibuprofen (ADVIL, MOTRIN), naproxen (ALEVE, NAPROSYN), celecoxib (CELEBREX), meloxicam (MOBIC), ketorolac (TORADOL).  Take this medication with food and water.     Medication Instructions - Opioids - Tapering Instructions    In the first three days following surgery, your symptoms may warrant use of the narcotic pain medication every four to six hours as prescribed. This is normal. As your pain symptoms improve, focus your efforts on decreasing (tapering) use of narcotic medications. The most successful tapering strategy is to first, decrease the number of tablets you take every 4-6 hours to the minimum prescribed. Then, increase the amount of time between doses.  For example:  First, taper to   or 1 tablet every 4-6 hours.  Then, taper to   or 1 tablet every 6-8 hours.  Then, taper to   or 1 tablet every 8-10 hours.  Then, taper to   or 1 tablet every 10-12 hours.  Then, taper to   or 1 tablet at bedtime.  The bedtime dose can help with comfort during sleep and is typically the last dose to be discontinued after surgery.     Follow Up Care    Follow-up with your  Surgeon Team in 10-14 days for wound check.     Activity - Total Knee Arthroplasty     Return to Driving    Return to driving - Driving is NOT permitted until directed by your provider. Under no circumstance are you permitted to drive while using narcotic pain medications.     NO Precautions    No precautions directed by your Provider.  You may perform range of motion activities as tolerated.     Weight bearing as tolerated    Weight bearing as tolerated on your operative extremity.     Dressing / Wound Care - Wound    You have a clean dressing on your surgical wound. Dressing change instructions as follows: dressing will be removed at your follow-up appointment. Contact your Surgeon Team if you have increased redness, warmth around the surgical wound, and/or drainage from the surgical wound.     Dressing Wound Care - Shower with wound/dressing NOT covered    You do not need to cover your dressing in the shower, you may allow water and soap to run over top of the surgical dressing or incision. You may shower 2 days after surgery.  You are strictly prohibited from soaking or submerging the surgical wound underwater.     Dressing / Wound Care - NO Tub Bathing    Tub bathing, swimming, or any other activities that will cause your incision to be submerged in water should be avoided until allowed by your Surgeon.     Medication instructions -  Anticoagulation - aspirin    Take the aspirin as prescribed for a total of six weeks after surgery.  This is given to help minimize your risk of blood clot.     Medication Instructions - Opioid Instructions (Greater than or equal to 65 years)    You were discharged with an opioid medication (hydromorphone, oxycodone, hydrocodone, or tramadol). This medication should only be taken for breakthrough pain that is not controlled with acetaminophen (TYLENOL). If you rate your pain less than 3 you do not need this medication.  Pain rating 0-3:  You do not need this medication  Pain rating  4-6:  Take 1/2 tablet every 4-6 hours as needed  Pain rating 7-10:  Take 1 tablet every 4-6 hours as needed  Do not exceed 4 tablets per day     General info for SNF    Length of Stay Estimate: Short Term Care: Estimated # of Days <30 Condition at Discharge: Stable Level of care:skilled  Rehabilitation Potential: Good Admission H&P remains valid and up-to-date: Yes Recent Chemotherapy: N/A Use Nursing Home Standing Orders: Yes     Mantoux Instructions    Give two-step Mantoux (PPD) Per Facility Policy {.:[id number]     Incentive Spirometry    Incentive Spirometry 10 times per hour, 4 times per day.     Physical Therapy Adult Consult    Evaluate and treat as clinically indicated.    Reason: Status Post Knee Surgery     Occupational Therapy Adult Consult    Evaluate and treat as clinically indicated.    Reason: Status Post Knee Surgery     Crutches DME    DME Documentation: Describe the reason for need to support medical necessity: Impaired gait status post knee surgery. I, the undersigned, certify that the above prescribed supplies are medically necessary for this patient and is both reasonable and necessary in reference to accepted standards of medical practice in the treatment of this patient's condition and is not prescribed as a convenience.     Cane DME    DME Documentation: Describe the reason for need to support medical necessity: Impaired gait status post knee surgery. I, the undersigned, certify that the above prescribed supplies are medically necessary for this patient and is both reasonable and necessary in reference to accepted standards of medical practice in the treatment of this patient's condition and is not prescribed as a convenience.     Walker DME    DME Documentation: Describe the reason for need to support medical necessity: Impaired gait status post knee surgery. I, the undersigned, certify that the above prescribed supplies are medically necessary for this patient and is both reasonable and  necessary in reference to accepted standards of medical practice in the treatment of this patient's condition and is not prescribed as a convenience.     Discharge Instruction - Regular Diet Adult    Return to your pre-surgery diet unless instructed otherwise       Discharge Medications     Discharge Medication List as of 11/21/2023  9:43 AM        START taking these medications    Details   aspirin 81 MG EC tablet Take 1 tablet (81 mg) by mouth 2 times daily, Disp-90 tablet, R-0, E-Prescribe      polyethylene glycol (MIRALAX) 17 g packet Take 17 g by mouth daily, Disp-7 packet, R-0, E-Prescribe      senna-docusate (SENOKOT-S/PERICOLACE) 8.6-50 MG tablet Take 1-2 tablets by mouth 2 times daily Take while on oral narcotics to prevent or treat constipation., Disp-30 tablet, R-0, E-PrescribeWhile taking narcotics      HYDROcodone-acetaminophen (NORCO) 5-325 MG tablet Take 1-2 tablets by mouth every 4 hours as needed for moderate pain 1 if pain rated 1-5 & 2 if 6-10., Disp-30 tablet, R-0, Local Print           CONTINUE these medications which have CHANGED    Details   acetaminophen (TYLENOL) 325 MG tablet Take 2 tablets (650 mg) by mouth every 4 hours as needed for other (mild pain), Disp-100 tablet, R-0, E-Prescribe           CONTINUE these medications which have NOT CHANGED    Details   atorvastatin (LIPITOR) 40 MG tablet Take 40 mg by mouth daily for cholesterol, Historical      CHOLECALCIFEROL, VITAMIN D3, (VITAMIN D3 ORAL) [CHOLECALCIFEROL, VITAMIN D3, (VITAMIN D3 ORAL)] Take 2,000 Units by mouth daily., Historical      cyanocobalamin (CYANOCOBALAMIN) 1000 MCG/ML injection Inject 1 mL into the muscle every 30 days, Historical      FLUoxetine (PROZAC) 40 MG capsule Take 40 mg by mouth daily, Historical      levothyroxine (SYNTHROID/LEVOTHROID) 100 MCG tablet Take 100 mcg by mouth daily, Historical      PSYLLIUM SEED, WITH SUGAR, (METAMUCIL ORAL) Take 1 Tablespoonful by mouth daily as needed, Historical                After Discharge Recommendations:  1. Resume pre-admission diet  2. DVT prophylaxis: aspirin  3. Follow up: She should see Dr. Beck in clinic in 1-2wks.  4. Sutures or staples will be removed by orthopedic surgeon at the 10-14 day follow-up appointment.  5. Weight Bearing WBAT (Weight bearing as tolerated).  6. Additional followup: None  7. Special instructions: None    Total time spent for discharge on date of discharge: 25 minutes    Physician(s) in addition to primary physician who should receive a copy:  Primary Care Physician Claire Hearn  Orthopedic Medicine and Surgery -[phone]    CK [place]...................  11/29/2023   3:25 PM

## 2024-08-02 ENCOUNTER — LAB REQUISITION (OUTPATIENT)
Dept: LAB | Facility: CLINIC | Age: 84
End: 2024-08-02
Payer: COMMERCIAL

## 2024-08-02 DIAGNOSIS — G31.84 MILD COGNITIVE IMPAIRMENT OF UNCERTAIN OR UNKNOWN ETIOLOGY: ICD-10-CM

## 2024-08-02 DIAGNOSIS — E21.3 HYPERPARATHYROIDISM, UNSPECIFIED (H): ICD-10-CM

## 2024-08-02 DIAGNOSIS — R32 UNSPECIFIED URINARY INCONTINENCE: ICD-10-CM

## 2024-08-02 DIAGNOSIS — E03.9 HYPOTHYROIDISM, UNSPECIFIED: ICD-10-CM

## 2024-08-02 LAB
ALBUMIN SERPL BCG-MCNC: 4.3 G/DL (ref 3.5–5.2)
ALP SERPL-CCNC: 49 U/L (ref 40–150)
ALT SERPL W P-5'-P-CCNC: 15 U/L (ref 0–50)
ANION GAP SERPL CALCULATED.3IONS-SCNC: 12 MMOL/L (ref 7–15)
AST SERPL W P-5'-P-CCNC: 14 U/L (ref 0–45)
BILIRUB SERPL-MCNC: 0.4 MG/DL
BUN SERPL-MCNC: 15.6 MG/DL (ref 8–23)
CALCIUM SERPL-MCNC: 9.8 MG/DL (ref 8.8–10.4)
CHLORIDE SERPL-SCNC: 107 MMOL/L (ref 98–107)
CREAT SERPL-MCNC: 0.8 MG/DL (ref 0.51–0.95)
EGFRCR SERPLBLD CKD-EPI 2021: 73 ML/MIN/1.73M2
GLUCOSE SERPL-MCNC: 96 MG/DL (ref 70–99)
HCO3 SERPL-SCNC: 23 MMOL/L (ref 22–29)
POTASSIUM SERPL-SCNC: 4 MMOL/L (ref 3.4–5.3)
PROT SERPL-MCNC: 7.2 G/DL (ref 6.4–8.3)
PTH-INTACT SERPL-MCNC: 72 PG/ML (ref 15–65)
SODIUM SERPL-SCNC: 142 MMOL/L (ref 135–145)
TSH SERPL DL<=0.005 MIU/L-ACNC: 2.44 UIU/ML (ref 0.3–4.2)
VIT D+METAB SERPL-MCNC: 50 NG/ML (ref 20–50)

## 2024-08-02 PROCEDURE — 83970 ASSAY OF PARATHORMONE: CPT | Performed by: FAMILY MEDICINE

## 2024-08-02 PROCEDURE — 82306 VITAMIN D 25 HYDROXY: CPT | Performed by: FAMILY MEDICINE

## 2024-08-02 PROCEDURE — 83921 ORGANIC ACID SINGLE QUANT: CPT | Performed by: FAMILY MEDICINE

## 2024-08-02 PROCEDURE — 83921 ORGANIC ACID SINGLE QUANT: CPT | Mod: ORL | Performed by: FAMILY MEDICINE

## 2024-08-02 PROCEDURE — 84443 ASSAY THYROID STIM HORMONE: CPT | Performed by: FAMILY MEDICINE

## 2024-08-02 PROCEDURE — 80053 COMPREHEN METABOLIC PANEL: CPT | Performed by: FAMILY MEDICINE

## 2024-08-06 ENCOUNTER — LAB REQUISITION (OUTPATIENT)
Dept: LAB | Facility: CLINIC | Age: 84
End: 2024-08-06
Payer: MEDICARE

## 2024-08-06 DIAGNOSIS — R32 UNSPECIFIED URINARY INCONTINENCE: ICD-10-CM

## 2024-08-06 DIAGNOSIS — Z13.21 ENCOUNTER FOR SCREENING FOR NUTRITIONAL DISORDER: ICD-10-CM

## 2024-08-06 LAB — VIT B12 SERPL-MCNC: 527 PG/ML (ref 232–1245)

## 2024-08-06 PROCEDURE — 87186 SC STD MICRODIL/AGAR DIL: CPT | Mod: ORL | Performed by: FAMILY MEDICINE

## 2024-08-06 PROCEDURE — 87086 URINE CULTURE/COLONY COUNT: CPT | Mod: ORL | Performed by: FAMILY MEDICINE

## 2024-08-06 PROCEDURE — 82607 VITAMIN B-12: CPT | Mod: ORL | Performed by: FAMILY MEDICINE

## 2024-08-07 LAB
BACTERIA UR CULT: ABNORMAL
METHYLMALONATE SERPL-SCNC: 0.31 UMOL/L (ref 0–0.4)

## 2024-08-27 ENCOUNTER — LAB REQUISITION (OUTPATIENT)
Dept: LAB | Facility: CLINIC | Age: 84
End: 2024-08-27
Payer: MEDICARE

## 2024-08-27 DIAGNOSIS — Z01.818 ENCOUNTER FOR OTHER PREPROCEDURAL EXAMINATION: ICD-10-CM

## 2024-08-27 LAB
ANION GAP SERPL CALCULATED.3IONS-SCNC: 12 MMOL/L (ref 7–15)
BUN SERPL-MCNC: 16.4 MG/DL (ref 8–23)
CALCIUM SERPL-MCNC: 9.9 MG/DL (ref 8.8–10.4)
CHLORIDE SERPL-SCNC: 106 MMOL/L (ref 98–107)
CREAT SERPL-MCNC: 0.83 MG/DL (ref 0.51–0.95)
EGFRCR SERPLBLD CKD-EPI 2021: 70 ML/MIN/1.73M2
GLUCOSE SERPL-MCNC: 126 MG/DL (ref 70–99)
HCO3 SERPL-SCNC: 24 MMOL/L (ref 22–29)
POTASSIUM SERPL-SCNC: 4.2 MMOL/L (ref 3.4–5.3)
SODIUM SERPL-SCNC: 142 MMOL/L (ref 135–145)

## 2024-08-27 PROCEDURE — 87086 URINE CULTURE/COLONY COUNT: CPT | Mod: ORL | Performed by: STUDENT IN AN ORGANIZED HEALTH CARE EDUCATION/TRAINING PROGRAM

## 2024-08-27 PROCEDURE — 80048 BASIC METABOLIC PNL TOTAL CA: CPT | Mod: ORL | Performed by: STUDENT IN AN ORGANIZED HEALTH CARE EDUCATION/TRAINING PROGRAM

## 2024-08-28 ENCOUNTER — LAB REQUISITION (OUTPATIENT)
Dept: LAB | Facility: CLINIC | Age: 84
End: 2024-08-28
Payer: MEDICARE

## 2024-08-28 DIAGNOSIS — R32 UNSPECIFIED URINARY INCONTINENCE: ICD-10-CM

## 2024-08-29 LAB — BACTERIA UR CULT: NORMAL

## 2024-10-12 ENCOUNTER — HEALTH MAINTENANCE LETTER (OUTPATIENT)
Age: 84
End: 2024-10-12

## 2024-12-02 ENCOUNTER — LAB REQUISITION (OUTPATIENT)
Dept: LAB | Facility: CLINIC | Age: 84
End: 2024-12-02
Payer: COMMERCIAL

## 2024-12-02 DIAGNOSIS — R19.7 DIARRHEA, UNSPECIFIED: ICD-10-CM

## 2024-12-02 PROCEDURE — 87507 IADNA-DNA/RNA PROBE TQ 12-25: CPT | Mod: ORL | Performed by: FAMILY MEDICINE

## 2024-12-04 LAB
ADV 40+41 DNA STL QL NAA+NON-PROBE: NEGATIVE
ASTRO TYP 1-8 RNA STL QL NAA+NON-PROBE: NEGATIVE
C CAYETANENSIS DNA STL QL NAA+NON-PROBE: NEGATIVE
CAMPYLOBACTER DNA SPEC NAA+PROBE: NEGATIVE
CRYPTOSP DNA STL QL NAA+NON-PROBE: NEGATIVE
E COLI O157 DNA STL QL NAA+NON-PROBE: NORMAL
E HISTOLYT DNA STL QL NAA+NON-PROBE: NEGATIVE
EAEC ASTA GENE ISLT QL NAA+PROBE: NEGATIVE
EC STX1+STX2 GENES STL QL NAA+NON-PROBE: NEGATIVE
EPEC EAE GENE STL QL NAA+NON-PROBE: NEGATIVE
ETEC LTA+ST1A+ST1B TOX ST NAA+NON-PROBE: NEGATIVE
G LAMBLIA DNA STL QL NAA+NON-PROBE: NEGATIVE
NOROVIRUS GI+II RNA STL QL NAA+NON-PROBE: NEGATIVE
P SHIGELLOIDES DNA STL QL NAA+NON-PROBE: NEGATIVE
RVA RNA STL QL NAA+NON-PROBE: NEGATIVE
SALMONELLA SP RPOD STL QL NAA+PROBE: NEGATIVE
SAPO I+II+IV+V RNA STL QL NAA+NON-PROBE: NEGATIVE
SHIGELLA SP+EIEC IPAH ST NAA+NON-PROBE: NEGATIVE
V CHOLERAE DNA SPEC QL NAA+PROBE: NEGATIVE
VIBRIO DNA SPEC NAA+PROBE: NEGATIVE
Y ENTEROCOL DNA STL QL NAA+PROBE: NEGATIVE

## (undated) DEVICE — SOL NACL 0.9% IRRIG 1000ML BOTTLE 2F7124

## (undated) DEVICE — BONE CLEANING TIP INTERPULSE  0210-010-000

## (undated) DEVICE — BONE CEMENT MIXEVAC HI VAC W/CARTRIDGE 0306-563-000

## (undated) DEVICE — CAST PADDING 4" COTTON WEBRIL UNSTERILE 9084

## (undated) DEVICE — ESU GROUND PAD UNIVERSAL W/O CORD

## (undated) DEVICE — STOCKINETTE COMPRESSION EDEMAWEAR MEDIUM 34X30IN B960001

## (undated) DEVICE — SOL WATER IRRIG 1000ML BOTTLE 2F7114

## (undated) DEVICE — BLADE KNIFE SURG 11 371111

## (undated) DEVICE — WRAP EZY KNEE 1213PP

## (undated) DEVICE — SU VICRYL 1 CTX CR 8X18" J765D

## (undated) DEVICE — GLOVE BIOGEL PI MICRO INDICATOR UNDERGLOVE SZ 7.0 48970

## (undated) DEVICE — GOWN IMPERVIOUS SPECIALTY XL/XLONG 39049

## (undated) DEVICE — GLOVE BIOGEL PI MICRO SZ 7.0 48570

## (undated) DEVICE — SOLUTION WOUND CLEANSING 3/4OZ 10% PVP EA-L3011FB-50

## (undated) DEVICE — DRAPE STERI TOWEL LG 1010

## (undated) DEVICE — DRSG AQUACEL AG 3.5X9.75" HYDROFIBER 412011

## (undated) DEVICE — LINEN TOWEL PACK X5 5464

## (undated) DEVICE — CAST PADDING 6" UNSTERILE 9046

## (undated) DEVICE — SYR 30ML LL W/O NDL 302832

## (undated) DEVICE — GLOVE BIOGEL PI MICRO INDICATOR UNDERGLOVE SZ 8.0 48980

## (undated) DEVICE — CAST PADDING 6" STERILE 9046S

## (undated) DEVICE — DECANTER BAG 2002S

## (undated) DEVICE — DRSG STERI STRIP 1/2X4" R1547

## (undated) DEVICE — BLADE SAW SAGITTAL STRK 31X63X0.64MM HD 4/2000 2108-140-006

## (undated) DEVICE — NDL 21GA 1.5"

## (undated) DEVICE — GLOVE BIOGEL PI SZ 7.5 40875

## (undated) DEVICE — GOWN XXLG REINFORCED 9071EL

## (undated) DEVICE — GLOVE BIOGEL PI MICRO SZ 8.0 48580

## (undated) DEVICE — SU VICRYL 2-0 CT 36" J957H

## (undated) DEVICE — DRSG KERLIX FLUFFS X5

## (undated) DEVICE — DRAPE STERI U 1015

## (undated) DEVICE — SU VICRYL 1 CT 36" J959H

## (undated) DEVICE — HOOD SURG T7PLUS PEEL AWAY FACE SHIELD STRL LF 0416-801-100

## (undated) DEVICE — PREP SKIN SCRUB TRAY 4461A

## (undated) DEVICE — BLADE SAW SAGITTAL THK1.13 MM L90 MM X W18 MM 4118-127-090

## (undated) DEVICE — SOL NACL 0.9% IRRIG 3000ML BAG 2B7477

## (undated) DEVICE — GLOVE BIOGEL PI SZ 6.5 40865

## (undated) DEVICE — PACK TOTAL KNEE SOP15TKFSD

## (undated) DEVICE — DRAPE IOBAN INCISE 23X17" 6650EZ

## (undated) DEVICE — STPL SKIN 35W 6.9MM  PXW35

## (undated) DEVICE — SUCTION IRR SYSTEM W/O TIP INTERPULSE HANDPIECE 0210-100-000

## (undated) DEVICE — DRSG ADAPTIC 3X8" 6113

## (undated) DEVICE — MANIFOLD NEPTUNE 4 PORT 700-20

## (undated) RX ORDER — VANCOMYCIN HYDROCHLORIDE 1 G/20ML
INJECTION, POWDER, LYOPHILIZED, FOR SOLUTION INTRAVENOUS
Status: DISPENSED
Start: 2023-11-17

## (undated) RX ORDER — ACETAMINOPHEN 500 MG
TABLET ORAL
Status: DISPENSED
Start: 2023-11-17

## (undated) RX ORDER — FENTANYL CITRATE 50 UG/ML
INJECTION, SOLUTION INTRAMUSCULAR; INTRAVENOUS
Status: DISPENSED
Start: 2023-11-17

## (undated) RX ORDER — TRANEXAMIC ACID 650 MG/1
TABLET ORAL
Status: DISPENSED
Start: 2023-11-17

## (undated) RX ORDER — DEXAMETHASONE SODIUM PHOSPHATE 4 MG/ML
INJECTION, SOLUTION INTRA-ARTICULAR; INTRALESIONAL; INTRAMUSCULAR; INTRAVENOUS; SOFT TISSUE
Status: DISPENSED
Start: 2023-11-17

## (undated) RX ORDER — ONDANSETRON 2 MG/ML
INJECTION INTRAMUSCULAR; INTRAVENOUS
Status: DISPENSED
Start: 2023-11-17